# Patient Record
Sex: MALE | Race: WHITE | Employment: OTHER | URBAN - METROPOLITAN AREA
[De-identification: names, ages, dates, MRNs, and addresses within clinical notes are randomized per-mention and may not be internally consistent; named-entity substitution may affect disease eponyms.]

---

## 2017-09-30 DIAGNOSIS — E78.5 HYPERLIPIDEMIA, UNSPECIFIED HYPERLIPIDEMIA TYPE: ICD-10-CM

## 2017-10-02 RX ORDER — SIMVASTATIN 40 MG
TABLET ORAL
Qty: 90 TABLET | Refills: 3 | Status: SHIPPED | OUTPATIENT
Start: 2017-10-02 | End: 2018-04-16

## 2017-10-09 ENCOUNTER — OFFICE VISIT (OUTPATIENT)
Dept: FAMILY MEDICINE CLINIC | Age: 67
End: 2017-10-09
Payer: MEDICARE

## 2017-10-09 VITALS
SYSTOLIC BLOOD PRESSURE: 116 MMHG | HEART RATE: 80 BPM | BODY MASS INDEX: 27.94 KG/M2 | DIASTOLIC BLOOD PRESSURE: 74 MMHG | RESPIRATION RATE: 16 BRPM | HEIGHT: 67 IN | WEIGHT: 178 LBS

## 2017-10-09 DIAGNOSIS — J44.9 CHRONIC OBSTRUCTIVE PULMONARY DISEASE, UNSPECIFIED COPD TYPE (HCC): ICD-10-CM

## 2017-10-09 DIAGNOSIS — E78.5 HYPERLIPIDEMIA, UNSPECIFIED HYPERLIPIDEMIA TYPE: ICD-10-CM

## 2017-10-09 DIAGNOSIS — Z23 NEED FOR INFLUENZA VACCINATION: ICD-10-CM

## 2017-10-09 DIAGNOSIS — Z00.00 ROUTINE GENERAL MEDICAL EXAMINATION AT A HEALTH CARE FACILITY: ICD-10-CM

## 2017-10-09 DIAGNOSIS — F22 PARANOIA (HCC): ICD-10-CM

## 2017-10-09 DIAGNOSIS — R73.01 IFG (IMPAIRED FASTING GLUCOSE): Primary | ICD-10-CM

## 2017-10-09 DIAGNOSIS — I10 ESSENTIAL HYPERTENSION: ICD-10-CM

## 2017-10-09 PROCEDURE — G8419 CALC BMI OUT NRM PARAM NOF/U: HCPCS | Performed by: FAMILY MEDICINE

## 2017-10-09 PROCEDURE — 3017F COLORECTAL CA SCREEN DOC REV: CPT | Performed by: FAMILY MEDICINE

## 2017-10-09 PROCEDURE — 4040F PNEUMOC VAC/ADMIN/RCVD: CPT | Performed by: FAMILY MEDICINE

## 2017-10-09 PROCEDURE — G0008 ADMIN INFLUENZA VIRUS VAC: HCPCS | Performed by: FAMILY MEDICINE

## 2017-10-09 PROCEDURE — 4004F PT TOBACCO SCREEN RCVD TLK: CPT | Performed by: FAMILY MEDICINE

## 2017-10-09 PROCEDURE — 3023F SPIROM DOC REV: CPT | Performed by: FAMILY MEDICINE

## 2017-10-09 PROCEDURE — 1123F ACP DISCUSS/DSCN MKR DOCD: CPT | Performed by: FAMILY MEDICINE

## 2017-10-09 PROCEDURE — 90662 IIV NO PRSV INCREASED AG IM: CPT | Performed by: FAMILY MEDICINE

## 2017-10-09 PROCEDURE — G8484 FLU IMMUNIZE NO ADMIN: HCPCS | Performed by: FAMILY MEDICINE

## 2017-10-09 PROCEDURE — G8427 DOCREV CUR MEDS BY ELIG CLIN: HCPCS | Performed by: FAMILY MEDICINE

## 2017-10-09 PROCEDURE — G0439 PPPS, SUBSEQ VISIT: HCPCS | Performed by: FAMILY MEDICINE

## 2017-10-09 PROCEDURE — 99214 OFFICE O/P EST MOD 30 MIN: CPT | Performed by: FAMILY MEDICINE

## 2017-10-09 PROCEDURE — G8926 SPIRO NO PERF OR DOC: HCPCS | Performed by: FAMILY MEDICINE

## 2017-10-09 RX ORDER — AMLODIPINE BESYLATE AND BENAZEPRIL HYDROCHLORIDE 5; 10 MG/1; MG/1
CAPSULE ORAL
Qty: 90 CAPSULE | Refills: 3 | Status: SHIPPED | OUTPATIENT
Start: 2017-10-09 | End: 2018-10-16 | Stop reason: SDUPTHER

## 2017-10-09 ASSESSMENT — LIFESTYLE VARIABLES: HOW OFTEN DO YOU HAVE A DRINK CONTAINING ALCOHOL: 0

## 2017-10-09 ASSESSMENT — ANXIETY QUESTIONNAIRES: GAD7 TOTAL SCORE: 0

## 2017-10-09 ASSESSMENT — ENCOUNTER SYMPTOMS
GASTROINTESTINAL NEGATIVE: 1
RESPIRATORY NEGATIVE: 1

## 2017-10-09 ASSESSMENT — PATIENT HEALTH QUESTIONNAIRE - PHQ9: SUM OF ALL RESPONSES TO PHQ QUESTIONS 1-9: 0

## 2017-10-09 NOTE — PROGRESS NOTES
Chronic Disease Visit Information    BP Readings from Last 3 Encounters:   10/09/17 116/74   10/05/16 126/76   03/23/16 110/56          Hemoglobin A1C (%)   Date Value   07/16/2015 6.1 (H)   02/25/2014 5.6     LDL Calculated (mg/dL)   Date Value   07/16/2015 73     HDL (mg/dl)   Date Value   07/16/2015 47   03/21/2012 30     BUN (mg/dl)   Date Value   07/16/2015 15     CREATININE (mg/dl)   Date Value   07/16/2015 1.0     Glucose (mg/dl)   Date Value   07/16/2015 105 (H)            Have you changed or started any medications since your last visit including any over-the-counter medicines, vitamins, or herbal medicines? no   Are you having any side effects from any of your medications? -  no  Have you stopped taking any of your medications? Is so, why? -  no    Have you seen any other physician or provider since your last visit? No  Have you had any other diagnostic tests since your last visit? No  Have you been seen in the emergency room and/or had an admission to a hospital since we last saw you? No  Have you had your annual diabetic retinal (eye) exam? No  Have you had your routine dental cleaning in the past 6 months? yes -      Have you activated your [employer] account? If not, what are your barriers?  No: declines     Patient Care Team:  Terri Wong DO as PCP - General (Family Medicine)  Terri Wong DO as PCP - [facility] Attributed Provider  Karel Vilchis as Physician (Psychiatry)         Medical History Review  Past Medical, Family, and Social History reviewed and does contribute to the patient presenting condition    Health Maintenance   Topic Date Due    AAA screen  1950    Hepatitis C screen  1950    DTaP/Tdap/Td vaccine (1 - Tdap) 03/07/1967    Flu vaccine (1) 09/01/2017    Diabetes screen  07/16/2018    Lipid screen  07/16/2020    Colon cancer screen colonoscopy  09/03/2024    Zostavax vaccine  Addressed    Pneumococcal low/med risk  Completed       Patient was given fluzone high dose vaccine 0.5 ml IM in left deltoid per v.o. Dr Tatiana Peabody. Patient tolerated well. VIS given and reviewed.   NDC# 48329-184-41  LOT# [id number]  Exp: 5/10/18

## 2017-10-09 NOTE — PROGRESS NOTES
Subjective:      Patient ID: Deana Puckett is a 69 y.o. male. HPI:  Annual eval.    Pt doing well overall. BP well controlled. BP Readings from Last 3 Encounters:   10/09/17 116/74   10/05/16 126/76   03/23/16 110/56     Breathing doing fine. Mood well controlled on current medications. Due for labs, plans to get this at [facility]. Patient Active Problem List   Diagnosis    HTN (hypertension)    Hyperlipidemia    [facility]ia ([facility] [address]    COPD (chronic obstructive pulmonary disease) ([facility] [address]    IFG (impaired fasting glucose)     Past Surgical History:   Procedure Laterality Date    FOOT SURGERY Right 11/23/2009    LEG SURGERY Right @ 2-3 yrs old   [place] VASECTOMY  @ 27 yrs old     Prior to Admission medications    Medication Sig Start Date End Date Taking? Authorizing Provider   simvastatin (ZOCOR) 40 MG tablet TAKE 1 TABLET AT BEDTIME 10/2/17  Yes Desire Casey, DO   amLODIPine-benazepril (LOTREL) 5-10 MG per capsule TAKE 1 CAPSULE DAILY 10/5/16  Yes Desire Casey DO   OLANZapine-FLUoxetine HCl 12-50 MG CAPS Take 1 capsule by mouth nightly 11/19/15  Yes Desire Casey,    FIBER FORMULA CAPS Take 160 mg by mouth daily   Yes Historical Provider, MD   aspirin 81 MG EC tablet Take 1 tablet by mouth daily.  9/8/14  Yes Adan Smith MD       Lab Results   Component Value Date    LABA1C 6.1 (H) 07/16/2015     No results found for: EAG    No components found for: CHLPL  Lab Results   Component Value Date    TRIG 116 07/16/2015    TRIG 125 02/26/2014    TRIG 82 03/30/2013     Lab Results   Component Value Date    HDL 47 07/16/2015    HDL 31 (A) 02/26/2014    HDL 40 03/30/2013     Lab Results   Component Value Date    LDLCALC 73 07/16/2015    LDLCALC 78 02/26/2014    LDLCALC 103 03/30/2013     Lab Results   Component Value Date    LABVLDL 23 07/16/2015         Chemistry        Component Value Date/Time     07/16/2015 0851    K 4.4 07/16/2015 0851     07/16/2015 0851    CO2 28 BP: 116/74   Site: Left Arm   Position: Sitting   Cuff Size: Medium Adult   Pulse: 80   Resp: 16   Weight: 178 lb (80.7 kg)   Height: 5' 7\" (1.702 m)           The following problems were reviewed today and where indicated follow up appointments were made and/or referrals ordered. Risk Factor Screenings with Interventions:   Fall Risk:  Timed Up and Go Test > 12 seconds?: no  2 or more falls in past year?: no  Fall with injury in past year?: no  Fall Risk Interventions:    · None indicated    Depression:  PHQ-2 Score: 0  Depression Interventions:  · None indicated    Anxiety:  Anxiety Score: 0  Anxiety Interventions:  · None indicated    Cognitive: Words recalled: 3  Clock Drawing Test (CDT) Score: Normal  Cognitive Impairment Interventions:  · None indicated    Substance Abuse:  Social History     Social History Main Topics    Smoking status: Current Every Day Smoker     Packs/day: 2.00     Years: 45.00     Types: Cigars     Last attempt to quit: 6/22/2015    Smokeless tobacco: Former User     Quit date: 10/1/2011    Alcohol use No    Drug use: No    Sexual activity: Not on file     Audit Questionnaire: Screen for Alcohol Misuse  How often do you have a drink containing alcohol?: Never  Substance Abuse Interventions:  · None indicated    Health Risk Assessment:   General  In general, how would you say your health is?: Very Good  In the past 7 days, have you experienced any of the following?: None of These  Do you get the social and emotional support that you need?: Yes  Do you have a Living Will?: Yes  General Health Risk Interventions:  · None indicated    Health Habits/Nutrition  Do you exercise for at least 20 minutes 2-3 times per week?: (!) No  Have you lost any weight without trying in the past 3 months?: No  Do you eat fewer than 2 meals per day?: No  Have you seen a dentist within the past year?: Yes  Body mass index is 27.88 kg/m².   Health Habits/Nutrition Interventions:  · Inadequate physical activity:  patient agrees to wear a pedometer and walk at least 10,000 steps/day    Hearing/Vision  Do you or your family notice any trouble with your hearing?: No  Do you have difficulty driving, watching TV, or doing any of your daily activities because of your eyesight?: No  Have you had an eye exam within the past year?: Yes  Hearing/Vision Interventions:  · None indicated    Safety  Do you have working smoke detectors?: Yes  Have all throw rugs been removed or fastened?: Yes  Do you have non-slip mats in all bathtubs?: Yes  Do all of your stairways have a railing or banister?: Yes (no stairs)  Are your doorways, halls and stairs free of clutter?: Yes  Do you always fasten your seatbelt when you are in a car?: (!) No  Safety Interventions:  · Home safety tips provided    ADLs  In the past 7 days, did you need help from others to perform any of the following everyday activities?: None  In the past 7 days, did you need help from others to take care of any of the following?: None  ADL Interventions:  · None indicated    Personalized Preventive Plan   Current Health Maintenance Status  Immunization History   Administered Date(s) Administered    Influenza Vaccine, unspecified formulation 10/15/2014, 09/14/2015    Influenza, High Dose 10/09/2017    Influenza, Quadv, 3 Years and older, IM 10/05/2016    Pneumococcal 13-valent Conjugate (Birtha Pries) 07/15/2015    Pneumococcal Polysaccharide ([id number]) 02/24/2014        Health Maintenance   Topic Date Due    AAA screen  1950    Hepatitis C screen  1950    DTaP/Tdap/Td vaccine (1 - Tdap) 03/07/1967    Diabetes screen  07/16/2018    Lipid screen  07/16/2020    Colon cancer screen colonoscopy  09/03/2024    Zostavax vaccine  Addressed    Flu vaccine  Completed    Pneumococcal low/med risk  Completed     Recommendations for Preventive Services Due: see orders.   Recommended screening schedule for the next 5-10 years is provided to the patient in

## 2017-10-09 NOTE — PATIENT INSTRUCTIONS
respiratory therapist.  The four appointments span over three weeks. The respiratory therapist schedules one of the appointments to occur 48 hours after the patients quit date.  Cost $100 total for the four sessions. Tobacco cessation products are not included in the cost and are not provided by [facility] for Leonel Roberto - 10/9/2017  Medicare offers a range of preventive health benefits. Some of the tests and screenings are paid in full while other may be subject to a deductible, co-insurance, and/or copay. Some of these benefits include a comprehensive review of your medical history including lifestyle, illnesses that may run in your family, and various assessments and screenings as appropriate. After reviewing your medical record and screening and assessments performed today your provider may have ordered immunizations, labs, imaging, and/or referrals for you. A list of these orders (if applicable) as well as your Preventive Care list are included within your After Visit Summary for your review. Other Preventive Recommendations:    · A preventive eye exam performed by an eye specialist is recommended every 1-2 years to screen for glaucoma; cataracts, macular degeneration, and other eye disorders. · A preventive dental visit is recommended every 6 months. · Try to get at least 150 minutes of exercise per week or 10,000 steps per day on a pedometer . · Order or download the FREE \"Exercise & Physical Activity: Your Everyday Guide\" from [employer]. Call 6-915.543.9639 or search [employer] online. · You need 0577-0950 mg of calcium and 0302-6813 IU of vitamin D per day.  It is possible to meet your calcium requirement with diet alone, but a vitamin D supplement is usually necessary to meet this goal.  · When exposed to the sun, use a sunscreen that protects against both UVA and UVB radiation with an SPF of

## 2017-10-27 ENCOUNTER — OFFICE VISIT (OUTPATIENT)
Dept: FAMILY MEDICINE CLINIC | Age: 67
End: 2017-10-27
Payer: MEDICARE

## 2017-10-27 VITALS
DIASTOLIC BLOOD PRESSURE: 66 MMHG | WEIGHT: 171 LBS | HEART RATE: 68 BPM | RESPIRATION RATE: 12 BRPM | HEIGHT: 66 IN | OXYGEN SATURATION: 96 % | BODY MASS INDEX: 27.48 KG/M2 | SYSTOLIC BLOOD PRESSURE: 100 MMHG | TEMPERATURE: 97.5 F

## 2017-10-27 DIAGNOSIS — J40 BRONCHITIS: Primary | ICD-10-CM

## 2017-10-27 DIAGNOSIS — Z12.5 SCREENING FOR PROSTATE CANCER: ICD-10-CM

## 2017-10-27 PROCEDURE — 4040F PNEUMOC VAC/ADMIN/RCVD: CPT | Performed by: FAMILY MEDICINE

## 2017-10-27 PROCEDURE — 99213 OFFICE O/P EST LOW 20 MIN: CPT | Performed by: FAMILY MEDICINE

## 2017-10-27 PROCEDURE — G8484 FLU IMMUNIZE NO ADMIN: HCPCS | Performed by: FAMILY MEDICINE

## 2017-10-27 PROCEDURE — G8419 CALC BMI OUT NRM PARAM NOF/U: HCPCS | Performed by: FAMILY MEDICINE

## 2017-10-27 PROCEDURE — G8427 DOCREV CUR MEDS BY ELIG CLIN: HCPCS | Performed by: FAMILY MEDICINE

## 2017-10-27 PROCEDURE — 3017F COLORECTAL CA SCREEN DOC REV: CPT | Performed by: FAMILY MEDICINE

## 2017-10-27 PROCEDURE — 1123F ACP DISCUSS/DSCN MKR DOCD: CPT | Performed by: FAMILY MEDICINE

## 2017-10-27 PROCEDURE — 4004F PT TOBACCO SCREEN RCVD TLK: CPT | Performed by: FAMILY MEDICINE

## 2017-10-27 RX ORDER — AZITHROMYCIN 250 MG/1
TABLET, FILM COATED ORAL
Qty: 6 TABLET | Refills: 0 | Status: SHIPPED | OUTPATIENT
Start: 2017-10-27 | End: 2017-11-06

## 2017-10-27 RX ORDER — PREDNISONE 20 MG/1
20 TABLET ORAL 2 TIMES DAILY
Qty: 10 TABLET | Refills: 0 | Status: SHIPPED | OUTPATIENT
Start: 2017-10-27 | End: 2017-11-01

## 2017-10-27 ASSESSMENT — ENCOUNTER SYMPTOMS
SHORTNESS OF BREATH: 0
COUGH: 1
RHINORRHEA: 1
WHEEZING: 0
GASTROINTESTINAL NEGATIVE: 1

## 2017-10-27 NOTE — PROGRESS NOTES
Visit Information    Have you changed or started any medications since your last visit including any over-the-counter medicines, vitamins, or herbal medicines? no   Are you having any side effects from any of your medications? -  no  Have you stopped taking any of your medications? Is so, why? -  no    Have you seen any other physician or provider since your last visit? No  Have you had any other diagnostic tests since your last visit? No  Have you been seen in the emergency room and/or had an admission to a hospital since we last saw you? No  Have you had your routine dental cleaning in the past 6 months? yes -     Have you activated your [employer] account? If not, what are your barriers?  No: does not have a computer     Patient Care Team:  Silvia Victor DO as PCP - General (Family Medicine)  Silvia Victor DO as PCP - [facility] Attributed Provider  Socorro Lyon as Physician (Psychiatry)    Medical History Review  Past Medical, Family, and Social History reviewed and does not contribute to the patient presenting condition    Health Maintenance   Topic Date Due    AAA screen  1950    Hepatitis C screen  1950    DTaP/Tdap/Td vaccine (1 - Tdap) 03/07/1967    Diabetes screen  07/16/2018    Lipid screen  07/16/2020    Colon cancer screen colonoscopy  09/03/2024    Zostavax vaccine  Addressed    Flu vaccine  Completed    Pneumococcal low/med risk  Completed
Negative. Musculoskeletal: Negative. All other systems reviewed and are negative. Objective:   Physical Exam   Constitutional: He is oriented to person, place, and time. He appears well-developed and well-nourished. HENT:   Head: Normocephalic and atraumatic. Right Ear: Tympanic membrane normal.   Left Ear: Tympanic membrane normal.   Mouth/Throat: Oropharynx is clear and moist and mucous membranes are normal.   Cardiovascular: Normal rate, regular rhythm and normal heart sounds. No murmur heard. Pulmonary/Chest: Effort normal and breath sounds normal. He has no decreased breath sounds. He has no wheezes. He has no rhonchi. He has no rales. Abdominal: Soft. Bowel sounds are normal.   Musculoskeletal: He exhibits no edema. Neurological: He is alert and oriented to person, place, and time. Skin: Skin is warm and dry. Psychiatric: He has a normal mood and affect. His behavior is normal.   Nursing note and vitals reviewed. Assessment:      1. Bronchitis  azithromycin (ZITHROMAX Z-MARIE) 250 MG tablet    predniSONE (DELTASONE) 20 MG tablet   2.  Screening for prostate cancer  PSA, Prostatic Specific Antigen           Plan:      -  Orders above  -  OTC Coricidin  -  Check PSA at his request, will call  -  RTO prn

## 2017-10-27 NOTE — PATIENT INSTRUCTIONS
respiratory therapist.  The four appointments span over three weeks. The respiratory therapist schedules one of the appointments to occur 48 hours after the patients quit date.  Cost $100 total for the four sessions.   Tobacco cessation products are not included in the cost and are not provided by [facility].     Andrew Kirk

## 2017-11-08 ENCOUNTER — OFFICE VISIT (OUTPATIENT)
Dept: FAMILY MEDICINE CLINIC | Age: 67
End: 2017-11-08
Payer: MEDICARE

## 2017-11-08 VITALS
RESPIRATION RATE: 16 BRPM | HEART RATE: 92 BPM | HEIGHT: 66 IN | TEMPERATURE: 98 F | DIASTOLIC BLOOD PRESSURE: 50 MMHG | BODY MASS INDEX: 27.97 KG/M2 | WEIGHT: 174 LBS | OXYGEN SATURATION: 97 % | SYSTOLIC BLOOD PRESSURE: 100 MMHG

## 2017-11-08 DIAGNOSIS — R05.8 UPPER AIRWAY COUGH SYNDROME: Primary | ICD-10-CM

## 2017-11-08 PROCEDURE — G8427 DOCREV CUR MEDS BY ELIG CLIN: HCPCS | Performed by: FAMILY MEDICINE

## 2017-11-08 PROCEDURE — 1123F ACP DISCUSS/DSCN MKR DOCD: CPT | Performed by: FAMILY MEDICINE

## 2017-11-08 PROCEDURE — 4004F PT TOBACCO SCREEN RCVD TLK: CPT | Performed by: FAMILY MEDICINE

## 2017-11-08 PROCEDURE — 3017F COLORECTAL CA SCREEN DOC REV: CPT | Performed by: FAMILY MEDICINE

## 2017-11-08 PROCEDURE — 4040F PNEUMOC VAC/ADMIN/RCVD: CPT | Performed by: FAMILY MEDICINE

## 2017-11-08 PROCEDURE — G8484 FLU IMMUNIZE NO ADMIN: HCPCS | Performed by: FAMILY MEDICINE

## 2017-11-08 PROCEDURE — 99213 OFFICE O/P EST LOW 20 MIN: CPT | Performed by: FAMILY MEDICINE

## 2017-11-08 PROCEDURE — G8419 CALC BMI OUT NRM PARAM NOF/U: HCPCS | Performed by: FAMILY MEDICINE

## 2017-11-08 RX ORDER — IPRATROPIUM BROMIDE 42 UG/1
2 SPRAY, METERED NASAL 3 TIMES DAILY
Qty: 1 BOTTLE | Refills: 2 | Status: SHIPPED | OUTPATIENT
Start: 2017-11-08 | End: 2017-12-15 | Stop reason: ALTCHOICE

## 2017-11-08 RX ORDER — PREDNISONE 20 MG/1
20 TABLET ORAL 2 TIMES DAILY
Qty: 10 TABLET | Refills: 0 | Status: SHIPPED | OUTPATIENT
Start: 2017-11-08 | End: 2017-11-13

## 2017-11-08 RX ORDER — BENZONATATE 100 MG/1
100 CAPSULE ORAL 3 TIMES DAILY PRN
Qty: 15 CAPSULE | Refills: 0 | Status: SHIPPED | OUTPATIENT
Start: 2017-11-08 | End: 2017-11-13

## 2017-11-08 ASSESSMENT — ENCOUNTER SYMPTOMS
WHEEZING: 0
RHINORRHEA: 1
SORE THROAT: 0
GASTROINTESTINAL NEGATIVE: 1
SHORTNESS OF BREATH: 0
COUGH: 1
SINUS PRESSURE: 0

## 2017-11-08 NOTE — PROGRESS NOTES
Subjective:      Patient ID: Torito Gomez is a 69 y.o. male. HPI:    Chief Complaint   Patient presents with    Cough     runny nose  onset 2 weeks     Pt here for persistent cough and RN. S/p Zpak and steroids with relief, symptoms returned once he completed the course of medication. No wheezing. No fevers. Denies sinus pressure or HA's. Denies GERD. Patient Active Problem List   Diagnosis    HTN (hypertension)    Hyperlipidemia    [facility] [address]    COPD (chronic obstructive pulmonary disease) ([facility] [address]    IFG (impaired fasting glucose)     Past Surgical History:   Procedure Laterality Date    FOOT SURGERY Right 11/23/2009    LEG SURGERY Right @ 2-3 yrs old   [employer] VASECTOMY  @ 27 yrs old     Prior to Admission medications    Medication Sig Start Date End Date Taking? Authorizing Provider   amLODIPine-benazepril (LOTREL) 5-10 MG per capsule TAKE 1 CAPSULE DAILY 10/9/17  Yes Terri Wong, DO   albuterol-ipratropium (COMBIVENT RESPIMAT)  MCG/ACT AERS inhaler Inhale 1 puff into the lungs every 6 hours as needed for Wheezing or Shortness of Breath 10/9/17  Yes Terri Wong, DO   simvastatin (ZOCOR) 40 MG tablet TAKE 1 TABLET AT BEDTIME 10/2/17  Yes Terri Wong, DO   OLANZapine-FLUoxetine HCl 12-50 MG CAPS Take 1 capsule by mouth nightly 11/19/15  Yes Terri Wong, DO   FIBER FORMULA CAPS Take 160 mg by mouth daily   Yes Historical Provider, MD   aspirin 81 MG EC tablet Take 1 tablet by mouth daily. 9/8/14  Yes Kobi Chakraborty MD         Review of Systems   Constitutional: Negative. Negative for fever. HENT: Positive for postnasal drip and rhinorrhea. Negative for congestion, sinus pressure and sore throat. Respiratory: Positive for cough. Negative for shortness of breath and wheezing. Cardiovascular: Negative. Gastrointestinal: Negative. Musculoskeletal: Negative. All other systems reviewed and are negative.       Objective:   Physical Exam Constitutional: He is oriented to person, place, and time. He appears well-developed and well-nourished. HENT:   Head: Normocephalic and atraumatic. Right Ear: Tympanic membrane normal.   Left Ear: Tympanic membrane normal.   Nose: Mucosal edema and rhinorrhea present. Mouth/Throat: Oropharynx is clear and moist. Mucous membranes are dry. Cardiovascular: Normal rate, regular rhythm and normal heart sounds. No murmur heard. Pulmonary/Chest: Effort normal and breath sounds normal. He has no decreased breath sounds. He has no wheezes. He has no rhonchi. He has no rales. Abdominal: Soft. Bowel sounds are normal.   Musculoskeletal: He exhibits no edema. Neurological: He is alert and oriented to person, place, and time. Skin: Skin is warm and dry. Psychiatric: He has a normal mood and affect. His behavior is normal.   Nursing note and vitals reviewed. Assessment:      1.  Upper airway cough syndrome  predniSONE (DELTASONE) 20 MG tablet    benzonatate (TESSALON PERLES) 100 MG capsule    ipratropium (ATROVENT) 0.06 % nasal spray           Plan:      -  Orders above  -  OTC Coricidin  -  Must stop smoking  -  Increase water intake  -  RTO prn

## 2017-12-15 ENCOUNTER — OFFICE VISIT (OUTPATIENT)
Dept: PSYCHIATRY | Age: 67
End: 2017-12-15
Payer: MEDICARE

## 2017-12-15 VITALS
SYSTOLIC BLOOD PRESSURE: 118 MMHG | DIASTOLIC BLOOD PRESSURE: 70 MMHG | HEART RATE: 72 BPM | WEIGHT: 180 LBS | HEIGHT: 66 IN | BODY MASS INDEX: 28.93 KG/M2

## 2017-12-15 DIAGNOSIS — F31.76 BIPOLAR I DISORDER, MODERATE, CURRENT OR MOST RECENT EPISODE DEPRESSED, IN FULL REMISSION (HCC): Primary | ICD-10-CM

## 2017-12-15 PROCEDURE — 1036F TOBACCO NON-USER: CPT | Performed by: NURSE PRACTITIONER

## 2017-12-15 PROCEDURE — 90792 PSYCH DIAG EVAL W/MED SRVCS: CPT | Performed by: NURSE PRACTITIONER

## 2017-12-15 RX ORDER — OLANZAPINE AND FLUOXETINE 12; 50 MG/1; MG/1
1 CAPSULE ORAL NIGHTLY
Qty: 30 CAPSULE | Refills: 2 | Status: SHIPPED | OUTPATIENT
Start: 2017-12-15 | End: 2018-03-15 | Stop reason: SDUPTHER

## 2017-12-15 ASSESSMENT — PATIENT HEALTH QUESTIONNAIRE - PHQ9
SUM OF ALL RESPONSES TO PHQ QUESTIONS 1-9: 0
10. IF YOU CHECKED OFF ANY PROBLEMS, HOW DIFFICULT HAVE THESE PROBLEMS MADE IT FOR YOU TO DO YOUR WORK, TAKE CARE OF THINGS AT HOME, OR GET ALONG WITH OTHER PEOPLE: 0
4. FEELING TIRED OR HAVING LITTLE ENERGY: 0
1. LITTLE INTEREST OR PLEASURE IN DOING THINGS: 0
SUM OF ALL RESPONSES TO PHQ9 QUESTIONS 1 & 2: 0
3. TROUBLE FALLING OR STAYING ASLEEP: 0
5. POOR APPETITE OR OVEREATING: 0
2. FEELING DOWN, DEPRESSED OR HOPELESS: 0
6. FEELING BAD ABOUT YOURSELF - OR THAT YOU ARE A FAILURE OR HAVE LET YOURSELF OR YOUR FAMILY DOWN: 0
8. MOVING OR SPEAKING SO SLOWLY THAT OTHER PEOPLE COULD HAVE NOTICED. OR THE OPPOSITE, BEING SO FIGETY OR RESTLESS THAT YOU HAVE BEEN MOVING AROUND A LOT MORE THAN USUAL: 0
7. TROUBLE CONCENTRATING ON THINGS, SUCH AS READING THE NEWSPAPER OR WATCHING TELEVISION: 0
9. THOUGHTS THAT YOU WOULD BE BETTER OFF DEAD, OR OF HURTING YOURSELF: 0

## 2017-12-15 ASSESSMENT — ANXIETY QUESTIONNAIRES
4. TROUBLE RELAXING: 0-NOT AT ALL SURE
1. FEELING NERVOUS, ANXIOUS, OR ON EDGE: 1-SEVERAL DAYS
5. BEING SO RESTLESS THAT IT IS HARD TO SIT STILL: 0-NOT AT ALL SURE
7. FEELING AFRAID AS IF SOMETHING AWFUL MIGHT HAPPEN: 1-SEVERAL DAYS
3. WORRYING TOO MUCH ABOUT DIFFERENT THINGS: 1-SEVERAL DAYS
6. BECOMING EASILY ANNOYED OR IRRITABLE: 0-NOT AT ALL SURE
2. NOT BEING ABLE TO STOP OR CONTROL WORRYING: 1-SEVERAL DAYS
GAD7 TOTAL SCORE: 4

## 2017-12-15 NOTE — PATIENT INSTRUCTIONS
Patient has been instructed to seek emergency help via the emergency and/or calling 911 should symptoms become severe, worsen, or with other concerning symptoms. Patient instructed to go immediately to the emergency room and/or call 911 with any suicidal or homicidal ideations or if audio/visual hallucinations develop. Patient given crisis center information.

## 2017-12-15 NOTE — PROGRESS NOTES
which has worked very well to control his moods. He reports he exercises regularly and follows a very strict diet because he has had weight gain while taking the olanzapine/fluoxetine medication. As long as he continues to monitor his diet and exercise regularly he does not experience any of the weight gain. Patient states he had several incidents where numerous co-workers were \"aggravating needs to quit drinking. \"  He recalls many of his coworkers suggesting he was an alcoholic because his arms and legs would often shake. Patient states \"I could not take no more of a sewing went to the hospital.\"  He states he did have one follow-up with the physician who suggested he keep a note pad of the coworkers who were bothering him and then report to his supervisor about these coworkers. He states that coworkers stopped bothering him at that time. He was working 12 hours per day 7 days per week. He states he recalls feeling depressed and down and sat at that time. He states he was not able to drive when he finally did present to the hospital.  Patient states \"I did not know where it was at. I remember getting asked questions and being told I was not listening, but I was clear out of it. \"  He states after a few days in the hospital he began feeling better and was allowed to go home for a few hours on the weekend but then returned to the hospital.  He states he remembers during his hospitalization that his mind would continuously wander. He recalls feeling extremely down and sad. Patient states he also remembers before he ever saw psychiatrist he would \"prance back and forth all the time. \"  He states he would come home from work and then would walk back and forth continuously in the living room. He would smoke excessively and was unable to sleep. He states he would go 3 or more days without sleep. When he went to his family doctor he was placed on \"nerve pills. \" He was then sent to the psychiatrist, but he on his own and has always managed his own finances. He lives off of his pension and [address]. All of his bills are automatically withdrawn and he has been able to save money. He states he has never been fired from a job and worked at [employer] for 31 years before he retired. Patient denies any excessive spending or other kiley type symptoms. Despite the stressors, patient states he believes his mood has been very well-controlled. Patient denies any feelings of depression or anxiety. He states he is sleeping well. Patient denies suicidal ideations, intent, plan. No homicidal ideations, intent, plan. No audio or visual hallucinations. Patient reports his childhood was very good. He felt well and well cared for. He got along well with others and had many friends. He states growing up he and his family got along well. He did well in school and did not get into any trouble. HPI      PSYCHIATRIC HISTORY:    Patient has had prior care with the following:    [x] Psychiatrist (Dr. Bruce Maurer, Dr. Janice Cruz, and Dr. Jean Carlos Larios)    [] Psychologist    [] Other Therapist    [] None    The patient has had 1 lifetime suicide attempts. Methods used for the suicide attempts include placing a gun into his mouth. The patient's most recent suicide attempt was 0. Patient reports 1 psych hospital admissions with the last admission taking place in the 1980s for 3 weeks. States he was told he had a \"chemical imbalance in my system. \"    Past psychiatric medications include: Lithium, Xanax, Valium, Lamictal    Adverse reactions from psychotropic medications:  Weight gain with Olanzapine-Fluoxetine - controlled with diet and exercise      Lifetime Psychiatric Review of Systems         Kiley or Hypomania:  yes - see above     Panic Attacks:  no     Phobias:  no     Obsessions and Compulsions:  no     Body or Vocal Tics:  no     Hallucinations:  no     Delusions:  no    SOCIAL HISTORY:  Patient was born in [place], [place] and raised by his biological parents      Social History     Social History    Marital status:      Spouse name: N/A    Number of children: 1    Years of education: N/A     Occupational History    Not on file. Social History Main Topics    Smoking status: Former Smoker     Packs/day: 1.00     Years: 45.00     Types: Cigars    Smokeless tobacco: Never Used      Comment: quit smoking in 6/2015 but restarted in 2016. States he smokes the \"little tiny cigars\"    Alcohol use No    Drug use: No    Sexual activity: Not on file     Other Topics Concern    Not on file     Social History Narrative    12/15/2017    LEVEL OF EDUCATION: graduated high school    SPECIAL EDUCATION NEEDS: None    RESIDENCE: Currently lives alone    LEGAL HISTORY: DUI in 2000 or 2001. Lost his 's license for 6 months. Then after that he quit drinking alcohol completely. 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 [employer] home at age 44 after 32 years at [employer]. Then worked at several different positions before retiring again in 2015 after working the previous 8 years for the [employer]. Patient states he wants to go find a job because \"I have too much time on my hands and I get bored sitting around at home. \" States he would like to work part-time    SUBSTANCE USE:    1. Alcohol: patient states \"I drank too much in the 70s. I regret it now but back then I didn't think I was drinking that much. \" Was drinking every day in the 70s before he got . After he  he only drank when they went to an event. States his last alcohol drink was in 2003. MARRIAGE: been  twice. First marriage at age 23 lasted for 3 years. Second marriage was for 10 or 11 years with the divorce finalized in 1989.     CHILDREN: one biological adult daughter             FAMILY HISTORY:   Family History grossly intact   Memory: intact   Insight:  good   Judgment: good   Suicidal Ideations: denies suicidal ideation   Homicidal Ideations: Negative for homicidal ideation      Medication Side Effects: absent       Attention Span attention span and concentration were age appropriate     Screenings Completed in This Encounter:     Anxiety and Depression:      CARLOS-7  Feeling nervious, anxious, or on edge: 1-Several days  Not able to stop or control worryin-Several days  Worrying too much about different things: 1-Several days  Trouble relaxin-Not at all sure  Being so restless that it's hard to sit still: 0-Not at all sure  Becoming easily annoyed or irritable: 0-Not at all sure  Feeling afraid as if something awful might happen: 1-Several days  CARLOS-7 Total Score: 4    PHQ-2 Over the past 2 weeks, how often have you been bothered by any of the following problems? Little interest or pleasure in doing things: Not at all  Feeling down, depressed, or hopeless: Not at all  PHQ-2 Score: 0  PHQ-9 Over the past 2 weeks, how often have you been bothered by any of the following problems? Trouble falling or staying asleep, or sleeping too much: Not at all  Feeling tired or having little energy: Not at all  Poor appetite or overeating: Not at all  Feeling bad about yourself - or that you are a failure or have let yourself or your family down: Not at all  Trouble concentrating on things, such as reading the newspaper or watching television: Not at all  Moving or speaking so slowly that other people could have noticed.  Or the opposite - being so fidgety or restless that you have been moving around a lot more than usual: Not at all  Thoughts that you would be better off dead, or of hurting yourself in some way: Not at all  If you checked off any problems, how difficult have these problems made it for you to do your work, take care of things at home, or get along with other people?: Not difficult at all  PHQ-9 Completed?:

## 2018-03-15 ENCOUNTER — OFFICE VISIT (OUTPATIENT)
Dept: PSYCHIATRY | Age: 68
End: 2018-03-15
Payer: MEDICARE

## 2018-03-15 VITALS
HEIGHT: 66 IN | BODY MASS INDEX: 29.73 KG/M2 | DIASTOLIC BLOOD PRESSURE: 66 MMHG | SYSTOLIC BLOOD PRESSURE: 126 MMHG | HEART RATE: 85 BPM | WEIGHT: 185 LBS

## 2018-03-15 DIAGNOSIS — F31.76 BIPOLAR I DISORDER, MODERATE, CURRENT OR MOST RECENT EPISODE DEPRESSED, IN FULL REMISSION (HCC): ICD-10-CM

## 2018-03-15 PROCEDURE — 3017F COLORECTAL CA SCREEN DOC REV: CPT | Performed by: NURSE PRACTITIONER

## 2018-03-15 PROCEDURE — G8419 CALC BMI OUT NRM PARAM NOF/U: HCPCS | Performed by: NURSE PRACTITIONER

## 2018-03-15 PROCEDURE — 1123F ACP DISCUSS/DSCN MKR DOCD: CPT | Performed by: NURSE PRACTITIONER

## 2018-03-15 PROCEDURE — 4040F PNEUMOC VAC/ADMIN/RCVD: CPT | Performed by: NURSE PRACTITIONER

## 2018-03-15 PROCEDURE — G8427 DOCREV CUR MEDS BY ELIG CLIN: HCPCS | Performed by: NURSE PRACTITIONER

## 2018-03-15 PROCEDURE — 99213 OFFICE O/P EST LOW 20 MIN: CPT | Performed by: NURSE PRACTITIONER

## 2018-03-15 PROCEDURE — 4004F PT TOBACCO SCREEN RCVD TLK: CPT | Performed by: NURSE PRACTITIONER

## 2018-03-15 PROCEDURE — G8482 FLU IMMUNIZE ORDER/ADMIN: HCPCS | Performed by: NURSE PRACTITIONER

## 2018-03-15 RX ORDER — OLANZAPINE AND FLUOXETINE 12; 50 MG/1; MG/1
1 CAPSULE ORAL NIGHTLY
Qty: 30 CAPSULE | Refills: 2 | Status: SHIPPED | OUTPATIENT
Start: 2018-03-15 | End: 2018-06-11 | Stop reason: SDUPTHER

## 2018-04-16 ENCOUNTER — OFFICE VISIT (OUTPATIENT)
Dept: FAMILY MEDICINE CLINIC | Age: 68
End: 2018-04-16
Payer: MEDICARE

## 2018-04-16 VITALS
SYSTOLIC BLOOD PRESSURE: 124 MMHG | HEART RATE: 84 BPM | BODY MASS INDEX: 30.67 KG/M2 | HEIGHT: 66 IN | DIASTOLIC BLOOD PRESSURE: 84 MMHG | RESPIRATION RATE: 15 BRPM | WEIGHT: 190.8 LBS | OXYGEN SATURATION: 97 %

## 2018-04-16 DIAGNOSIS — M54.16 LUMBAR RADICULITIS: ICD-10-CM

## 2018-04-16 DIAGNOSIS — E78.49 OTHER HYPERLIPIDEMIA: ICD-10-CM

## 2018-04-16 DIAGNOSIS — F22 PARANOIA (HCC): ICD-10-CM

## 2018-04-16 DIAGNOSIS — R73.01 IFG (IMPAIRED FASTING GLUCOSE): ICD-10-CM

## 2018-04-16 DIAGNOSIS — I10 ESSENTIAL HYPERTENSION: ICD-10-CM

## 2018-04-16 DIAGNOSIS — R29.898 LEG WEAKNESS, BILATERAL: Primary | ICD-10-CM

## 2018-04-16 DIAGNOSIS — J44.9 CHRONIC OBSTRUCTIVE PULMONARY DISEASE, UNSPECIFIED COPD TYPE (HCC): ICD-10-CM

## 2018-04-16 DIAGNOSIS — F31.76 BIPOLAR I DISORDER, MODERATE, CURRENT OR MOST RECENT EPISODE DEPRESSED, IN FULL REMISSION (HCC): ICD-10-CM

## 2018-04-16 DIAGNOSIS — M51.36 DDD (DEGENERATIVE DISC DISEASE), LUMBAR: ICD-10-CM

## 2018-04-16 PROCEDURE — 4004F PT TOBACCO SCREEN RCVD TLK: CPT | Performed by: FAMILY MEDICINE

## 2018-04-16 PROCEDURE — 3017F COLORECTAL CA SCREEN DOC REV: CPT | Performed by: FAMILY MEDICINE

## 2018-04-16 PROCEDURE — G8926 SPIRO NO PERF OR DOC: HCPCS | Performed by: FAMILY MEDICINE

## 2018-04-16 PROCEDURE — 3023F SPIROM DOC REV: CPT | Performed by: FAMILY MEDICINE

## 2018-04-16 PROCEDURE — 1123F ACP DISCUSS/DSCN MKR DOCD: CPT | Performed by: FAMILY MEDICINE

## 2018-04-16 PROCEDURE — 4040F PNEUMOC VAC/ADMIN/RCVD: CPT | Performed by: FAMILY MEDICINE

## 2018-04-16 PROCEDURE — G8427 DOCREV CUR MEDS BY ELIG CLIN: HCPCS | Performed by: FAMILY MEDICINE

## 2018-04-16 PROCEDURE — G8417 CALC BMI ABV UP PARAM F/U: HCPCS | Performed by: FAMILY MEDICINE

## 2018-04-16 PROCEDURE — 99214 OFFICE O/P EST MOD 30 MIN: CPT | Performed by: FAMILY MEDICINE

## 2018-04-16 ASSESSMENT — ENCOUNTER SYMPTOMS
RESPIRATORY NEGATIVE: 1
GASTROINTESTINAL NEGATIVE: 1
BACK PAIN: 1

## 2018-04-18 ENCOUNTER — HOSPITAL ENCOUNTER (OUTPATIENT)
Dept: GENERAL RADIOLOGY | Age: 68
Discharge: HOME | End: 2018-04-18
Payer: MEDICARE

## 2018-04-18 ENCOUNTER — HOSPITAL ENCOUNTER (OUTPATIENT)
Age: 68
Discharge: HOME | End: 2018-04-18
Payer: MEDICARE

## 2018-04-18 DIAGNOSIS — M54.16 LUMBAR RADICULITIS: ICD-10-CM

## 2018-04-18 DIAGNOSIS — M51.36 DDD (DEGENERATIVE DISC DISEASE), LUMBAR: ICD-10-CM

## 2018-04-18 PROCEDURE — 72100 X-RAY EXAM L-S SPINE 2/3 VWS: CPT

## 2018-04-19 ENCOUNTER — TELEPHONE (OUTPATIENT)
Dept: PSYCHIATRY | Age: 68
End: 2018-04-19

## 2018-04-20 LAB
CHOLESTEROL/HDL RATIO: 4.8
CHOLESTEROL: 190 MG/DL
HDLC SERPL-MCNC: 40 MG/DL
LDL CHOLESTEROL CALCULATED: 120 MG/DL
LDL/HDL RATIO: 3
TRIGL SERPL-MCNC: 152 MG/DL
VLDLC SERPL CALC-MCNC: 30 MG/DL

## 2018-05-15 ENCOUNTER — TELEPHONE (OUTPATIENT)
Dept: PSYCHIATRY | Age: 68
End: 2018-05-15

## 2018-05-16 ENCOUNTER — OFFICE VISIT (OUTPATIENT)
Dept: FAMILY MEDICINE CLINIC | Age: 68
End: 2018-05-16
Payer: MEDICARE

## 2018-05-16 VITALS
WEIGHT: 195 LBS | OXYGEN SATURATION: 98 % | HEIGHT: 66 IN | RESPIRATION RATE: 14 BRPM | HEART RATE: 80 BPM | DIASTOLIC BLOOD PRESSURE: 50 MMHG | SYSTOLIC BLOOD PRESSURE: 96 MMHG | BODY MASS INDEX: 31.34 KG/M2

## 2018-05-16 DIAGNOSIS — E55.9 VITAMIN D DEFICIENCY: ICD-10-CM

## 2018-05-16 DIAGNOSIS — E78.49 OTHER HYPERLIPIDEMIA: ICD-10-CM

## 2018-05-16 DIAGNOSIS — L82.1 SEBORRHEIC KERATOSIS: Primary | ICD-10-CM

## 2018-05-16 DIAGNOSIS — R29.898 WEAKNESS OF BOTH LOWER EXTREMITIES: ICD-10-CM

## 2018-05-16 DIAGNOSIS — R26.81 UNSTABLE GAIT: ICD-10-CM

## 2018-05-16 DIAGNOSIS — R73.01 IFG (IMPAIRED FASTING GLUCOSE): ICD-10-CM

## 2018-05-16 DIAGNOSIS — F31.76 BIPOLAR I DISORDER, MODERATE, CURRENT OR MOST RECENT EPISODE DEPRESSED, IN FULL REMISSION (HCC): ICD-10-CM

## 2018-05-16 DIAGNOSIS — I10 ESSENTIAL HYPERTENSION: ICD-10-CM

## 2018-05-16 PROCEDURE — G8417 CALC BMI ABV UP PARAM F/U: HCPCS | Performed by: FAMILY MEDICINE

## 2018-05-16 PROCEDURE — G8427 DOCREV CUR MEDS BY ELIG CLIN: HCPCS | Performed by: FAMILY MEDICINE

## 2018-05-16 PROCEDURE — 4004F PT TOBACCO SCREEN RCVD TLK: CPT | Performed by: FAMILY MEDICINE

## 2018-05-16 PROCEDURE — 99214 OFFICE O/P EST MOD 30 MIN: CPT | Performed by: FAMILY MEDICINE

## 2018-05-16 PROCEDURE — 1123F ACP DISCUSS/DSCN MKR DOCD: CPT | Performed by: FAMILY MEDICINE

## 2018-05-16 PROCEDURE — 4040F PNEUMOC VAC/ADMIN/RCVD: CPT | Performed by: FAMILY MEDICINE

## 2018-05-16 PROCEDURE — 3017F COLORECTAL CA SCREEN DOC REV: CPT | Performed by: FAMILY MEDICINE

## 2018-05-16 ASSESSMENT — ENCOUNTER SYMPTOMS
ROS SKIN COMMENTS: MOLE ON BACK
BACK PAIN: 1
RESPIRATORY NEGATIVE: 1
GASTROINTESTINAL NEGATIVE: 1

## 2018-06-05 ENCOUNTER — HOSPITAL ENCOUNTER (EMERGENCY)
Age: 68
Discharge: HOME | End: 2018-06-05
Attending: FAMILY MEDICINE
Payer: MEDICARE

## 2018-06-05 VITALS
RESPIRATION RATE: 16 BRPM | DIASTOLIC BLOOD PRESSURE: 108 MMHG | HEART RATE: 89 BPM | OXYGEN SATURATION: 97 % | TEMPERATURE: 98.6 F | SYSTOLIC BLOOD PRESSURE: 169 MMHG

## 2018-06-05 DIAGNOSIS — M79.651 PAIN OF RIGHT THIGH: Primary | ICD-10-CM

## 2018-06-05 PROCEDURE — 99282 EMERGENCY DEPT VISIT SF MDM: CPT

## 2018-06-05 RX ORDER — IBUPROFEN 400 MG/1
400 TABLET ORAL EVERY 8 HOURS PRN
Qty: 15 TABLET | Refills: 0 | Status: SHIPPED | OUTPATIENT
Start: 2018-06-05 | End: 2018-08-16

## 2018-06-05 RX ORDER — METHYLPREDNISOLONE 4 MG/1
TABLET ORAL
Qty: 1 KIT | Refills: 0 | Status: SHIPPED | OUTPATIENT
Start: 2018-06-05 | End: 2018-06-11 | Stop reason: ALTCHOICE

## 2018-06-05 ASSESSMENT — ENCOUNTER SYMPTOMS
VOMITING: 0
RHINORRHEA: 0
DIARRHEA: 0
EYE REDNESS: 0
COUGH: 0
SORE THROAT: 0
NAUSEA: 0
ABDOMINAL PAIN: 0
WHEEZING: 0
SHORTNESS OF BREATH: 0
EYE DISCHARGE: 0
BACK PAIN: 0

## 2018-06-05 ASSESSMENT — PAIN DESCRIPTION - LOCATION: LOCATION: HIP;LEG

## 2018-06-05 ASSESSMENT — PAIN DESCRIPTION - ORIENTATION: ORIENTATION: RIGHT

## 2018-06-05 ASSESSMENT — PAIN DESCRIPTION - PAIN TYPE: TYPE: ACUTE PAIN

## 2018-06-05 ASSESSMENT — PAIN SCALES - GENERAL: PAINLEVEL_OUTOF10: 3

## 2018-06-11 ENCOUNTER — OFFICE VISIT (OUTPATIENT)
Dept: PSYCHIATRY | Age: 68
End: 2018-06-11
Payer: MEDICARE

## 2018-06-11 VITALS
HEIGHT: 66 IN | WEIGHT: 196 LBS | HEART RATE: 97 BPM | BODY MASS INDEX: 31.5 KG/M2 | SYSTOLIC BLOOD PRESSURE: 120 MMHG | DIASTOLIC BLOOD PRESSURE: 73 MMHG

## 2018-06-11 DIAGNOSIS — F31.76 BIPOLAR I DISORDER, MODERATE, CURRENT OR MOST RECENT EPISODE DEPRESSED, IN FULL REMISSION (HCC): ICD-10-CM

## 2018-06-11 PROCEDURE — G8427 DOCREV CUR MEDS BY ELIG CLIN: HCPCS | Performed by: NURSE PRACTITIONER

## 2018-06-11 PROCEDURE — 99213 OFFICE O/P EST LOW 20 MIN: CPT | Performed by: NURSE PRACTITIONER

## 2018-06-11 PROCEDURE — 4040F PNEUMOC VAC/ADMIN/RCVD: CPT | Performed by: NURSE PRACTITIONER

## 2018-06-11 PROCEDURE — 3017F COLORECTAL CA SCREEN DOC REV: CPT | Performed by: NURSE PRACTITIONER

## 2018-06-11 PROCEDURE — 4004F PT TOBACCO SCREEN RCVD TLK: CPT | Performed by: NURSE PRACTITIONER

## 2018-06-11 PROCEDURE — G8417 CALC BMI ABV UP PARAM F/U: HCPCS | Performed by: NURSE PRACTITIONER

## 2018-06-11 PROCEDURE — 1123F ACP DISCUSS/DSCN MKR DOCD: CPT | Performed by: NURSE PRACTITIONER

## 2018-06-11 RX ORDER — OLANZAPINE AND FLUOXETINE 12; 50 MG/1; MG/1
1 CAPSULE ORAL NIGHTLY
Qty: 90 CAPSULE | Refills: 0 | Status: SHIPPED | OUTPATIENT
Start: 2018-06-11 | End: 2018-09-05 | Stop reason: SDUPTHER

## 2018-06-14 ENCOUNTER — OFFICE VISIT (OUTPATIENT)
Dept: FAMILY MEDICINE CLINIC | Age: 68
End: 2018-06-14
Payer: MEDICARE

## 2018-06-14 VITALS
DIASTOLIC BLOOD PRESSURE: 74 MMHG | RESPIRATION RATE: 16 BRPM | HEART RATE: 60 BPM | HEIGHT: 66 IN | BODY MASS INDEX: 31.23 KG/M2 | WEIGHT: 194.3 LBS | SYSTOLIC BLOOD PRESSURE: 136 MMHG

## 2018-06-14 DIAGNOSIS — M54.16 LUMBAR RADICULITIS: Primary | ICD-10-CM

## 2018-06-14 PROCEDURE — 1123F ACP DISCUSS/DSCN MKR DOCD: CPT | Performed by: FAMILY MEDICINE

## 2018-06-14 PROCEDURE — G8417 CALC BMI ABV UP PARAM F/U: HCPCS | Performed by: FAMILY MEDICINE

## 2018-06-14 PROCEDURE — 4040F PNEUMOC VAC/ADMIN/RCVD: CPT | Performed by: FAMILY MEDICINE

## 2018-06-14 PROCEDURE — 4004F PT TOBACCO SCREEN RCVD TLK: CPT | Performed by: FAMILY MEDICINE

## 2018-06-14 PROCEDURE — G8427 DOCREV CUR MEDS BY ELIG CLIN: HCPCS | Performed by: FAMILY MEDICINE

## 2018-06-14 PROCEDURE — 3017F COLORECTAL CA SCREEN DOC REV: CPT | Performed by: FAMILY MEDICINE

## 2018-06-14 PROCEDURE — 99213 OFFICE O/P EST LOW 20 MIN: CPT | Performed by: FAMILY MEDICINE

## 2018-06-14 RX ORDER — PREDNISONE 20 MG/1
TABLET ORAL
Qty: 25 TABLET | Refills: 0 | Status: SHIPPED | OUTPATIENT
Start: 2018-06-14 | End: 2018-08-16 | Stop reason: ALTCHOICE

## 2018-06-14 ASSESSMENT — ENCOUNTER SYMPTOMS
BACK PAIN: 1
GASTROINTESTINAL NEGATIVE: 1
RESPIRATORY NEGATIVE: 1

## 2018-07-11 LAB
AVERAGE GLUCOSE: 128 MG/DL (ref 66–114)
CHOLESTEROL/HDL RATIO: 4.4
CHOLESTEROL: 185 MG/DL
HBA1C MFR BLD: 6.1 % (ref 4.2–5.8)
HDLC SERPL-MCNC: 42 MG/DL
LDL CHOLESTEROL CALCULATED: 116 MG/DL
LDL/HDL RATIO: 2.8
TRIGL SERPL-MCNC: 136 MG/DL
VLDLC SERPL CALC-MCNC: 27 MG/DL

## 2018-07-12 LAB — VITAMIN D 25-HYDROXY: 15 NG/ML

## 2018-08-16 ENCOUNTER — OFFICE VISIT (OUTPATIENT)
Dept: FAMILY MEDICINE CLINIC | Age: 68
End: 2018-08-16
Payer: MEDICARE

## 2018-08-16 VITALS
RESPIRATION RATE: 16 BRPM | DIASTOLIC BLOOD PRESSURE: 70 MMHG | SYSTOLIC BLOOD PRESSURE: 136 MMHG | BODY MASS INDEX: 30.52 KG/M2 | HEIGHT: 66 IN | WEIGHT: 189.9 LBS | HEART RATE: 80 BPM

## 2018-08-16 DIAGNOSIS — M54.16 LUMBAR RADICULAR PAIN: ICD-10-CM

## 2018-08-16 DIAGNOSIS — F31.76 BIPOLAR I DISORDER, MODERATE, CURRENT OR MOST RECENT EPISODE DEPRESSED, IN FULL REMISSION (HCC): ICD-10-CM

## 2018-08-16 DIAGNOSIS — M51.36 DDD (DEGENERATIVE DISC DISEASE), LUMBAR: ICD-10-CM

## 2018-08-16 DIAGNOSIS — R73.01 IFG (IMPAIRED FASTING GLUCOSE): ICD-10-CM

## 2018-08-16 DIAGNOSIS — J44.9 CHRONIC OBSTRUCTIVE PULMONARY DISEASE, UNSPECIFIED COPD TYPE (HCC): ICD-10-CM

## 2018-08-16 DIAGNOSIS — E55.9 VITAMIN D DEFICIENCY: ICD-10-CM

## 2018-08-16 DIAGNOSIS — E78.49 OTHER HYPERLIPIDEMIA: ICD-10-CM

## 2018-08-16 DIAGNOSIS — I10 ESSENTIAL HYPERTENSION: Primary | ICD-10-CM

## 2018-08-16 DIAGNOSIS — M16.11 PRIMARY OSTEOARTHRITIS OF RIGHT HIP: ICD-10-CM

## 2018-08-16 PROCEDURE — 99214 OFFICE O/P EST MOD 30 MIN: CPT | Performed by: FAMILY MEDICINE

## 2018-08-16 PROCEDURE — 1123F ACP DISCUSS/DSCN MKR DOCD: CPT | Performed by: FAMILY MEDICINE

## 2018-08-16 PROCEDURE — G8926 SPIRO NO PERF OR DOC: HCPCS | Performed by: FAMILY MEDICINE

## 2018-08-16 PROCEDURE — 3023F SPIROM DOC REV: CPT | Performed by: FAMILY MEDICINE

## 2018-08-16 PROCEDURE — 1101F PT FALLS ASSESS-DOCD LE1/YR: CPT | Performed by: FAMILY MEDICINE

## 2018-08-16 PROCEDURE — 3017F COLORECTAL CA SCREEN DOC REV: CPT | Performed by: FAMILY MEDICINE

## 2018-08-16 PROCEDURE — 4040F PNEUMOC VAC/ADMIN/RCVD: CPT | Performed by: FAMILY MEDICINE

## 2018-08-16 PROCEDURE — 4004F PT TOBACCO SCREEN RCVD TLK: CPT | Performed by: FAMILY MEDICINE

## 2018-08-16 PROCEDURE — G8427 DOCREV CUR MEDS BY ELIG CLIN: HCPCS | Performed by: FAMILY MEDICINE

## 2018-08-16 PROCEDURE — G8417 CALC BMI ABV UP PARAM F/U: HCPCS | Performed by: FAMILY MEDICINE

## 2018-08-16 RX ORDER — ETODOLAC 500 MG/1
500 TABLET, FILM COATED ORAL 2 TIMES DAILY
Qty: 60 TABLET | Refills: 2 | Status: SHIPPED | OUTPATIENT
Start: 2018-08-16 | End: 2019-01-01 | Stop reason: SDUPTHER

## 2018-08-16 ASSESSMENT — ENCOUNTER SYMPTOMS
RESPIRATORY NEGATIVE: 1
GASTROINTESTINAL NEGATIVE: 1
BACK PAIN: 1

## 2018-08-16 NOTE — PROGRESS NOTES
Subjective:      Patient ID: Tanmay Croft is a 77 y.o. male. HPI:    Chief Complaint   Patient presents with    3 Month Follow-Up    Results     3 month eval.  Doing well overall. Since last visit, pt was seen by Dr. Justa Lucas. Needs right hip replacement but will hold off for now. Also referred to Dr. Sal Monae for his lumbar radicular pain, appt 8/27 for consultation. BP well controlled. BP Readings from Last 3 Encounters:   08/16/18 136/70   06/14/18 136/74   06/11/18 120/73     Weight is down a few lbs. Quit eating out, cooking at home now due to cost.   Wt Readings from Last 3 Encounters:   08/16/18 189 lb 14.4 oz (86.1 kg)   06/14/18 194 lb 4.8 oz (88.1 kg)   06/11/18 196 lb (88.9 kg)     COPD stable. Mood well controlled. Patient Active Problem List   Diagnosis    HTN (hypertension)    Hyperlipidemia    Paranoia ([facility]    COPD (chronic obstructive pulmonary disease) ([facility]    IFG (impaired fasting glucose)    Bipolar I disorder, moderate, current or most recent episode depressed, in full remission [facility])     Past Surgical History:   Procedure Laterality Date    FOOT SURGERY Right 11/23/2009    LEG [facility] Right @ 2-1 yrs old   [facility]  @ 27 y[facility]     Prior to Admission medications    Medication Sig Start Date End Date Taking?  Authorizing Provider   OLANZapine-FLUoxetine HCl 12-50 MG CAPS Take 1 capsule by mouth nightly 6/11/18  Yes Francisco Feliz APRN - CNP   amLODIPine-benazepril (LOTREL) 5-10 MG per capsule TAKE 1 CAPSULE DAILY 10/9/17  Yes Vivica Patient, DO   albuterol-ipratropium (COMBIVENT RESPIMAT)  MCG/ACT AERS inhaler Inhale 1 puff into the lungs every 6 hours as needed for Wheezing or Shortness of Breath 10/9/17  Yes Vivica Patient, DO   FIBER FORMULA CAPS Take 160 mg by mouth daily   Yes Historical Provider, MD   ibuprofen (ADVIL;MOTRIN) 400 MG tablet Take 1 tablet by mouth every 8 hours as needed for Pain 6/5/18   Durga Jacobsen MD 02/24/2014           Review of Systems   Constitutional: Negative. HENT: Negative. Respiratory: Negative. Cardiovascular: Negative. Gastrointestinal: Negative. Musculoskeletal: Positive for arthralgias (right hip pain), back pain and gait problem. All other systems reviewed and are negative. Objective:   Physical Exam   Constitutional: He is oriented to person, place, and time. He appears well-developed and well-nourished. HENT:   Head: Normocephalic and atraumatic. Right Ear: Tympanic membrane normal.   Left Ear: Tympanic membrane normal.   Mouth/Throat: Oropharynx is clear and moist and mucous membranes are normal.   Neck: Carotid bruit is not present. Cardiovascular: Normal rate, regular rhythm and normal heart sounds. No murmur heard. Pulmonary/Chest: Effort normal and breath sounds normal.   Abdominal: Soft. Bowel sounds are normal.   Musculoskeletal: He exhibits no edema. Right hip: He exhibits decreased range of motion. Neurological: He is alert and oriented to person, place, and time. Gait (antalgic gait, walks with aid of cane) abnormal.   Skin: Skin is warm and dry. Psychiatric: He has a normal mood and affect. His behavior is normal.   Nursing note and vitals reviewed. Assessment:       Diagnosis Orders   1. Essential hypertension     2. Chronic obstructive pulmonary disease, unspecified COPD type ([address]     3. IFG (impaired fasting glucose)     4. Other hyperlipidemia     5. Bipolar I disorder, moderate, current or most recent episode depressed, in full remission ([address]     6. Primary osteoarthritis of right hip  etodolac (LODINE) 500 MG tablet   7. DDD (degenerative disc disease), lumbar     8. Lumbar radicular pain     9.  Vitamin D deficiency  vitamin D-3 (CHOLECALCIFEROL) 5000 units TABS           Plan:      -  Chronic medical problems stable  -  Continue current medications  -  Follow up with specialists as scheduled  -  Start Lodine for arthritic pain  - Start Vit D3  -  Labs reviewed, cholesterol ok off the simvastatin.   Will continue to hold due to myalgias  -  RTO 6 mos        Yamile Finney,

## 2018-08-16 NOTE — PATIENT INSTRUCTIONS
You may receive a survey about your visit with us today. The feedback from our patients helps us identify what is working well and where the service to all patients can be enhanced. Thank you! Tobacco Cessation Programs     Telephonic behavior modification  Sophie Spangler ([phone])   Counseling service for those who are ready to quit using tobacco.     Available for uninsured [place] residents, [place] recipients, pregnant women, or patients whose health plans or employers are members of the [address] [place] [address] behavior modification   http://[place]. [employer]uitlogix. org   Online support program to help patients through each step of the quitting process. Available 24 hours a day 7 days a week. Provides up to date researched based tool, step-by-step guides, and motivational messages. Online behavior modification   www.lungusa.org/stop-smoking/how-to-quit   HelpLine: [facility] (342-5704)   Email questions to:  [email or web address]. [employer]    Website offers resources to help tobacco users figure out their reasons for quitting and then take the big step of quitting for good. Hypnosis   Location: [facility], [facility], [place]   Contact: Solis Ansari, PhD at [phone]   Hypnosis for tobacco cessation   Cost $225 for the initial session and $175 for each session afterwards. Most patients require 6-8 sessions. There is the option to submit through the patients insurance. Hypnosis and behavior modification   Location: [facility],  [address] [facility] [employer] [facility], [place]   Contact: Alhaji Barnes, PhD at [phone]  Enedelia Streeter Counseling and hypnosis for nicotine addition   Cost: For uninsured patients:  Please call above phone number  Cost for insured patients depends on patients insurance plan.     Behavior modification   Location: [facility], [address] [facility] [address] JENIFFER [place]TEGA AM [employer] [address].[place], [address]: [address] four one-on-one appointments between the patient and a respiratory therapist.  The four appointments span over three weeks. The respiratory therapist schedules one of the appointments to occur 48 hours after the patients quit date.  Cost $100 total for the four sessions.   Tobacco cessation products are not included in the cost and are not provided by [facility].     Amy Londono

## 2018-08-16 NOTE — PROGRESS NOTES
03/07/1998    Low dose CT lung screening  03/07/2003    Flu vaccine (1) 09/01/2018    Potassium monitoring  04/21/2019    Creatinine monitoring  04/21/2019    A1C test (Diabetic or Prediabetic)  07/10/2019    Lipid screen  07/10/2023    Colon cancer screen colonoscopy  09/03/2024    Pneumococcal low/med risk  Completed

## 2018-09-05 DIAGNOSIS — F31.76 BIPOLAR I DISORDER, MODERATE, CURRENT OR MOST RECENT EPISODE DEPRESSED, IN FULL REMISSION (HCC): ICD-10-CM

## 2018-09-05 RX ORDER — OLANZAPINE AND FLUOXETINE 12; 50 MG/1; MG/1
CAPSULE ORAL
Qty: 90 CAPSULE | Refills: 0 | Status: SHIPPED | OUTPATIENT
Start: 2018-09-05 | End: 2018-09-11 | Stop reason: SDUPTHER

## 2018-09-11 ENCOUNTER — OFFICE VISIT (OUTPATIENT)
Dept: PSYCHIATRY | Age: 68
End: 2018-09-11
Payer: MEDICARE

## 2018-09-11 VITALS
SYSTOLIC BLOOD PRESSURE: 154 MMHG | BODY MASS INDEX: 31.96 KG/M2 | WEIGHT: 198 LBS | HEART RATE: 92 BPM | DIASTOLIC BLOOD PRESSURE: 81 MMHG

## 2018-09-11 DIAGNOSIS — F31.76 BIPOLAR I DISORDER, MODERATE, CURRENT OR MOST RECENT EPISODE DEPRESSED, IN FULL REMISSION (HCC): ICD-10-CM

## 2018-09-11 PROCEDURE — 4004F PT TOBACCO SCREEN RCVD TLK: CPT | Performed by: NURSE PRACTITIONER

## 2018-09-11 PROCEDURE — 3017F COLORECTAL CA SCREEN DOC REV: CPT | Performed by: NURSE PRACTITIONER

## 2018-09-11 PROCEDURE — G8417 CALC BMI ABV UP PARAM F/U: HCPCS | Performed by: NURSE PRACTITIONER

## 2018-09-11 PROCEDURE — 1123F ACP DISCUSS/DSCN MKR DOCD: CPT | Performed by: NURSE PRACTITIONER

## 2018-09-11 PROCEDURE — G8427 DOCREV CUR MEDS BY ELIG CLIN: HCPCS | Performed by: NURSE PRACTITIONER

## 2018-09-11 PROCEDURE — 4040F PNEUMOC VAC/ADMIN/RCVD: CPT | Performed by: NURSE PRACTITIONER

## 2018-09-11 PROCEDURE — 99213 OFFICE O/P EST LOW 20 MIN: CPT | Performed by: NURSE PRACTITIONER

## 2018-09-11 PROCEDURE — 1101F PT FALLS ASSESS-DOCD LE1/YR: CPT | Performed by: NURSE PRACTITIONER

## 2018-09-11 RX ORDER — OLANZAPINE AND FLUOXETINE 12; 50 MG/1; MG/1
CAPSULE ORAL
Qty: 90 CAPSULE | Refills: 0 | Status: SHIPPED | OUTPATIENT
Start: 2018-09-11 | End: 2018-12-04 | Stop reason: SDUPTHER

## 2018-09-11 NOTE — PROGRESS NOTES
regarding his hip pain to discuss the treatment. Patient is encouraged to utilize nonpharmacologic coping skills when necessary. Patient encouraged to utilize deep breathing, meditation, calming music, guided imagery, muscle relaxation, or drooling. Patient is encouraged to stop smoking and smoking cessation techniques were reviewed. Risks, potential side effects, possible drug-drug interactions, benefits and alternate treatments discussed in detail. All questions answered. Patient stated understanding and is agreeable to treatment plan. Patient has been instructed to seek emergency help via the emergency and/or calling 911 should symptoms become severe, worsen, or with other concerning symptoms. Patient instructed to go immediately to the emergency room and/or call 911 with any suicidal or homicidal ideations or if audio/visual hallucinations develop  Patient stated understanding and agrees. Patient given crisis center information. Provider Signature:  Electronically signed by EDINSON Patel CNP on 9/11/2018 at 2:01 PM  **This report has been created using voice recognition software. It may contain minor errors which are inherent in voice recognition technology. **

## 2018-10-16 DIAGNOSIS — I10 ESSENTIAL HYPERTENSION: ICD-10-CM

## 2018-10-16 RX ORDER — AMLODIPINE BESYLATE AND BENAZEPRIL HYDROCHLORIDE 5; 10 MG/1; MG/1
CAPSULE ORAL
Qty: 90 CAPSULE | Refills: 3 | Status: SHIPPED | OUTPATIENT
Start: 2018-10-16 | End: 2019-01-01 | Stop reason: SDUPTHER

## 2018-10-16 RX ORDER — AMLODIPINE BESYLATE AND BENAZEPRIL HYDROCHLORIDE 5; 10 MG/1; MG/1
CAPSULE ORAL
Qty: 90 CAPSULE | Refills: 3 | OUTPATIENT
Start: 2018-10-16

## 2018-12-04 DIAGNOSIS — F31.76 BIPOLAR I DISORDER, MODERATE, CURRENT OR MOST RECENT EPISODE DEPRESSED, IN FULL REMISSION (HCC): ICD-10-CM

## 2018-12-04 RX ORDER — OLANZAPINE AND FLUOXETINE 12; 50 MG/1; MG/1
CAPSULE ORAL
Qty: 90 CAPSULE | Refills: 0 | Status: SHIPPED | OUTPATIENT
Start: 2018-12-04 | End: 2018-12-13 | Stop reason: SDUPTHER

## 2018-12-13 ENCOUNTER — OFFICE VISIT (OUTPATIENT)
Dept: PSYCHIATRY | Age: 68
End: 2018-12-13
Payer: MEDICARE

## 2018-12-13 VITALS — BODY MASS INDEX: 32.14 KG/M2 | HEIGHT: 66 IN | WEIGHT: 200 LBS

## 2018-12-13 DIAGNOSIS — F31.76 BIPOLAR I DISORDER, MODERATE, CURRENT OR MOST RECENT EPISODE DEPRESSED, IN FULL REMISSION (HCC): Primary | ICD-10-CM

## 2018-12-13 PROCEDURE — 3017F COLORECTAL CA SCREEN DOC REV: CPT | Performed by: NURSE PRACTITIONER

## 2018-12-13 PROCEDURE — G8484 FLU IMMUNIZE NO ADMIN: HCPCS | Performed by: NURSE PRACTITIONER

## 2018-12-13 PROCEDURE — 99213 OFFICE O/P EST LOW 20 MIN: CPT | Performed by: NURSE PRACTITIONER

## 2018-12-13 PROCEDURE — 1123F ACP DISCUSS/DSCN MKR DOCD: CPT | Performed by: NURSE PRACTITIONER

## 2018-12-13 PROCEDURE — G8417 CALC BMI ABV UP PARAM F/U: HCPCS | Performed by: NURSE PRACTITIONER

## 2018-12-13 PROCEDURE — 4040F PNEUMOC VAC/ADMIN/RCVD: CPT | Performed by: NURSE PRACTITIONER

## 2018-12-13 PROCEDURE — 4004F PT TOBACCO SCREEN RCVD TLK: CPT | Performed by: NURSE PRACTITIONER

## 2018-12-13 PROCEDURE — G8427 DOCREV CUR MEDS BY ELIG CLIN: HCPCS | Performed by: NURSE PRACTITIONER

## 2018-12-13 PROCEDURE — 1101F PT FALLS ASSESS-DOCD LE1/YR: CPT | Performed by: NURSE PRACTITIONER

## 2018-12-13 RX ORDER — OLANZAPINE AND FLUOXETINE 12; 50 MG/1; MG/1
CAPSULE ORAL
Qty: 90 CAPSULE | Refills: 1 | Status: SHIPPED | OUTPATIENT
Start: 2018-12-13 | End: 2019-01-01 | Stop reason: SDUPTHER

## 2018-12-13 NOTE — PROGRESS NOTES
[facility] PROFESSIONAL SERVS  [facility]  [address] [employer]  [address]  Dept: [phone]  Dept Fax: [phone]: [phone]    Visit Date: 12/13/2018    SUBJECTIVE DATA     CHIEF COMPLAINT:    Chief Complaint   Patient presents with   [address] Problem    3 Month Follow-Up    Medication Refill       History obtained from: patient    HISTORY OF PRESENT ILLNESS:    Delaney Russell is a 77 y.o. male who presents to the office for follow-up on his bipolar disorder and for medication refills. States his family has suggest he go into assisted living, but he doesn't want to do that  Has a roommate now  -the roommate helps him with chores     Planning to see a specialist to have his back fixed and hip replacement  -states he was told to have his back fixed first    Missed his medications for a couple of days  -had some increased anxiety  -resumed medications and symptoms resolved    Mood is doing well  -denies feeling down, sad, depressed  -reports good motivation and interest in activities  -sleeping well  -denies any irritability  -denies marlin or hypomania    Patient denies suicidal ideations, intent, plan. No homicidal ideations, intent, plan. No audio or visual hallucinations. HPI    Adverse reactions from psychotropic medications:  None at this time      Current Psychiatric Review of Systems         Marlin or Hypomania:  No     Panic Attacks:  no     Phobias:  no     Obsessions and Compulsions:  no     Body or Vocal Tics:  no     Hallucinations:  no     Delusions:  no    SOCIAL HISTORY:  Patient was born in [place], [place] and raised by his biological parents      Social History     Social History    Marital status:      Spouse name: N/A    Number of children: 1    Years of education: N/A     Occupational History    Not on file.      Social History Main Topics    Smoking status: Current Every Day Smoker     Packs/day: 1.50     Years: 45.00  Hayfever     Hyperlipidemia     Hypertension     Snores        PAST SURGICAL HISTORY:    Past Surgical History:   Procedure Laterality Date    FOOT SURGERY Right 11/23/2009    LEG SURGERY Right @ 2-1 yrs old   [facility]  @ 27 yrs old       PREVIOUS MEDICATIONS:  Previous Medications    ALBUTEROL-IPRATROPIUM (COMBIVENT RESPIMAT)  MCG/ACT AERS INHALER    Inhale 1 puff into the lungs every 6 hours as needed for Wheezing or Shortness of Breath    AMLODIPINE-BENAZEPRIL (LOTREL) 5-10 MG PER CAPSULE    TAKE 1 CAPSULE DAILY    ETODOLAC (LODINE) 500 MG TABLET    Take 1 tablet by mouth 2 times daily    FIBER FORMULA CAPS    Take 160 mg by mouth daily    VITAMIN D-3 (CHOLECALCIFEROL) 5000 UNITS TABS    Take 1 tablet by mouth daily       ALLERGIES:    Patient has no known allergies. REVIEW OF SYSTEMS:    ROS    The patient sees Lalita Gonzalez DO as his primary care provider.     SPECIALISTS: None    OBJECTIVE DATA     Ht 5' 6\" (1.676 m)   Wt 200 lb (90.7 kg)   BMI 32.28 kg/m²      Results for orders placed or performed in visit on 06/14/18   Lipid Panel   Result Value Ref Range    Cholesterol 185 <200 mg/dL    Triglycerides 136 <150 mg/dL    HDL 42 >39 mg/dL    LDL Calculated 116 <130 mg/dL    VLDL Cholesterol Calculated 27 <30 mg/dL    LDl/HDL Ratio 2.8 <3.5    Chol/HDL Ratio 4.4 <5   Hemoglobin A1C   Result Value Ref Range    Hemoglobin A1C 6.1 (H) 4.2 - 5.8 %    AVERAGE GLUCOSE 128 (H) 66 - 114 mg/dL   Vitamin D 25 Hydroxy   Result Value Ref Range    Vit D, 25-Hydroxy 15 (L) SEE BELOW NG/ML       Physical Exam    Mental Status Evaluation:   Orientation: Alert, oriented, thought content appropriate   Mood:. euthymic      Affect:  mood-congruent      Appearance:  Well groomed, age appropriate and casually dressed   Activity:  Within Normal Limits   Gait/Posture: Using cane for ambulation; slight limp noted with gait   Speech:  normal pitch, normal volume and clear, linear, appropriate, easy to

## 2019-01-01 ENCOUNTER — OFFICE VISIT (OUTPATIENT)
Dept: PSYCHIATRY | Age: 69
End: 2019-01-01
Payer: MEDICARE

## 2019-01-01 ENCOUNTER — OFFICE VISIT (OUTPATIENT)
Dept: FAMILY MEDICINE CLINIC | Age: 69
End: 2019-01-01
Payer: MEDICARE

## 2019-01-01 ENCOUNTER — TELEPHONE (OUTPATIENT)
Dept: FAMILY MEDICINE CLINIC | Age: 69
End: 2019-01-01

## 2019-01-01 ENCOUNTER — HOSPITAL ENCOUNTER (OUTPATIENT)
Dept: INTERVENTIONAL RADIOLOGY/VASCULAR | Age: 69
Discharge: HOME | End: 2019-02-05
Payer: MEDICARE

## 2019-01-01 ENCOUNTER — TELEPHONE (OUTPATIENT)
Dept: CARDIOLOGY CLINIC | Age: 69
End: 2019-01-01

## 2019-01-01 ENCOUNTER — HOSPITAL ENCOUNTER (OUTPATIENT)
Dept: INTERVENTIONAL RADIOLOGY/VASCULAR | Age: 69
Discharge: HOME | End: 2019-06-27
Payer: MEDICARE

## 2019-01-01 ENCOUNTER — OFFICE VISIT (OUTPATIENT)
Dept: CARDIOLOGY CLINIC | Age: 69
End: 2019-01-01
Payer: MEDICARE

## 2019-01-01 ENCOUNTER — OFFICE VISIT (OUTPATIENT)
Dept: PULMONOLOGY | Age: 69
End: 2019-01-01
Payer: MEDICARE

## 2019-01-01 ENCOUNTER — HOSPITAL ENCOUNTER (OUTPATIENT)
Age: 69
Discharge: HOME | End: 2019-09-17
Payer: MEDICARE

## 2019-01-01 ENCOUNTER — TELEPHONE (OUTPATIENT)
Dept: UROLOGY | Age: 69
End: 2019-01-01

## 2019-01-01 ENCOUNTER — CARE COORDINATION (OUTPATIENT)
Dept: CASE MANAGEMENT | Age: 69
End: 2019-01-01

## 2019-01-01 ENCOUNTER — OFFICE VISIT (OUTPATIENT)
Dept: UROLOGY | Age: 69
End: 2019-01-01
Payer: MEDICARE

## 2019-01-01 ENCOUNTER — TELEPHONE (OUTPATIENT)
Dept: PSYCHIATRY | Age: 69
End: 2019-01-01

## 2019-01-01 ENCOUNTER — HOSPITAL ENCOUNTER (OUTPATIENT)
Dept: GENERAL RADIOLOGY | Age: 69
Discharge: HOME | End: 2019-09-17
Payer: MEDICARE

## 2019-01-01 ENCOUNTER — HOSPITAL ENCOUNTER (OUTPATIENT)
Dept: PULMONOLOGY | Age: 69
Discharge: HOME | End: 2019-09-17
Payer: MEDICARE

## 2019-01-01 ENCOUNTER — TELEPHONE (OUTPATIENT)
Dept: PULMONOLOGY | Age: 69
End: 2019-01-01

## 2019-01-01 ENCOUNTER — HOSPITAL ENCOUNTER (OUTPATIENT)
Dept: CT IMAGING | Age: 69
Discharge: HOME | End: 2019-02-05
Payer: MEDICARE

## 2019-01-01 ENCOUNTER — HOSPITAL ENCOUNTER (OUTPATIENT)
Age: 69
Discharge: HOME | End: 2019-12-27
Payer: MEDICARE

## 2019-01-01 ENCOUNTER — HOSPITAL ENCOUNTER (OUTPATIENT)
Age: 69
Discharge: HOME | End: 2019-07-08
Payer: MEDICARE

## 2019-01-01 VITALS
SYSTOLIC BLOOD PRESSURE: 130 MMHG | OXYGEN SATURATION: 97 % | DIASTOLIC BLOOD PRESSURE: 88 MMHG | RESPIRATION RATE: 20 BRPM | BODY MASS INDEX: 34.23 KG/M2 | TEMPERATURE: 97.6 F | WEIGHT: 213 LBS | HEIGHT: 66 IN | HEART RATE: 95 BPM

## 2019-01-01 VITALS
WEIGHT: 195.9 LBS | SYSTOLIC BLOOD PRESSURE: 134 MMHG | HEART RATE: 90 BPM | BODY MASS INDEX: 31.62 KG/M2 | RESPIRATION RATE: 24 BRPM | OXYGEN SATURATION: 97 % | DIASTOLIC BLOOD PRESSURE: 80 MMHG

## 2019-01-01 VITALS
BODY MASS INDEX: 32.3 KG/M2 | SYSTOLIC BLOOD PRESSURE: 136 MMHG | HEIGHT: 66 IN | DIASTOLIC BLOOD PRESSURE: 72 MMHG | WEIGHT: 201 LBS

## 2019-01-01 VITALS
WEIGHT: 200 LBS | SYSTOLIC BLOOD PRESSURE: 108 MMHG | DIASTOLIC BLOOD PRESSURE: 72 MMHG | HEIGHT: 66 IN | BODY MASS INDEX: 32.14 KG/M2

## 2019-01-01 VITALS
RESPIRATION RATE: 28 BRPM | BODY MASS INDEX: 31.39 KG/M2 | SYSTOLIC BLOOD PRESSURE: 106 MMHG | OXYGEN SATURATION: 98 % | WEIGHT: 194.5 LBS | HEART RATE: 80 BPM | DIASTOLIC BLOOD PRESSURE: 54 MMHG

## 2019-01-01 VITALS
BODY MASS INDEX: 32.91 KG/M2 | SYSTOLIC BLOOD PRESSURE: 120 MMHG | WEIGHT: 203.9 LBS | HEART RATE: 92 BPM | DIASTOLIC BLOOD PRESSURE: 60 MMHG | RESPIRATION RATE: 18 BRPM | OXYGEN SATURATION: 96 %

## 2019-01-01 VITALS
HEART RATE: 76 BPM | WEIGHT: 194.9 LBS | RESPIRATION RATE: 20 BRPM | DIASTOLIC BLOOD PRESSURE: 68 MMHG | SYSTOLIC BLOOD PRESSURE: 132 MMHG | BODY MASS INDEX: 31.32 KG/M2 | HEIGHT: 66 IN

## 2019-01-01 VITALS
HEART RATE: 84 BPM | OXYGEN SATURATION: 97 % | DIASTOLIC BLOOD PRESSURE: 82 MMHG | BODY MASS INDEX: 32.62 KG/M2 | HEIGHT: 66 IN | SYSTOLIC BLOOD PRESSURE: 124 MMHG | WEIGHT: 203 LBS | RESPIRATION RATE: 20 BRPM

## 2019-01-01 VITALS — WEIGHT: 208 LBS | BODY MASS INDEX: 33.57 KG/M2

## 2019-01-01 VITALS
DIASTOLIC BLOOD PRESSURE: 84 MMHG | WEIGHT: 202.8 LBS | BODY MASS INDEX: 32.73 KG/M2 | SYSTOLIC BLOOD PRESSURE: 136 MMHG | OXYGEN SATURATION: 98 % | HEART RATE: 92 BPM

## 2019-01-01 VITALS
BODY MASS INDEX: 34.17 KG/M2 | DIASTOLIC BLOOD PRESSURE: 64 MMHG | HEART RATE: 83 BPM | SYSTOLIC BLOOD PRESSURE: 132 MMHG | HEIGHT: 66 IN | WEIGHT: 212.6 LBS

## 2019-01-01 DIAGNOSIS — G47.10 HYPERSOMNIA: ICD-10-CM

## 2019-01-01 DIAGNOSIS — Z72.0 TOBACCO ABUSE: ICD-10-CM

## 2019-01-01 DIAGNOSIS — M79.605 LEG PAIN, BILATERAL: ICD-10-CM

## 2019-01-01 DIAGNOSIS — R06.00 DYSPNEA, UNSPECIFIED TYPE: ICD-10-CM

## 2019-01-01 DIAGNOSIS — F31.76 BIPOLAR I DISORDER, MODERATE, CURRENT OR MOST RECENT EPISODE DEPRESSED, IN FULL REMISSION (HCC): Primary | ICD-10-CM

## 2019-01-01 DIAGNOSIS — Z01.818 PRE-OP EVALUATION: Primary | ICD-10-CM

## 2019-01-01 DIAGNOSIS — R73.01 IFG (IMPAIRED FASTING GLUCOSE): ICD-10-CM

## 2019-01-01 DIAGNOSIS — F31.76 BIPOLAR I DISORDER, MODERATE, CURRENT OR MOST RECENT EPISODE DEPRESSED, IN FULL REMISSION (HCC): ICD-10-CM

## 2019-01-01 DIAGNOSIS — I10 ESSENTIAL HYPERTENSION: ICD-10-CM

## 2019-01-01 DIAGNOSIS — I87.2 VENOUS INSUFFICIENCY: ICD-10-CM

## 2019-01-01 DIAGNOSIS — M79.604 LEG PAIN, BILATERAL: ICD-10-CM

## 2019-01-01 DIAGNOSIS — J44.9 CHRONIC OBSTRUCTIVE PULMONARY DISEASE, UNSPECIFIED COPD TYPE (HCC): ICD-10-CM

## 2019-01-01 DIAGNOSIS — R35.0 URINARY FREQUENCY: Primary | ICD-10-CM

## 2019-01-01 DIAGNOSIS — Z82.49 FAMILY HISTORY OF PREMATURE CAD: ICD-10-CM

## 2019-01-01 DIAGNOSIS — M48.062 SPINAL STENOSIS OF LUMBAR REGION WITH NEUROGENIC CLAUDICATION: ICD-10-CM

## 2019-01-01 DIAGNOSIS — E78.00 PURE HYPERCHOLESTEROLEMIA: ICD-10-CM

## 2019-01-01 DIAGNOSIS — R31.29 MICROSCOPIC HEMATURIA: ICD-10-CM

## 2019-01-01 DIAGNOSIS — J44.9 CHRONIC OBSTRUCTIVE PULMONARY DISEASE, UNSPECIFIED COPD TYPE (HCC): Primary | ICD-10-CM

## 2019-01-01 DIAGNOSIS — R06.02 SOB (SHORTNESS OF BREATH) ON EXERTION: ICD-10-CM

## 2019-01-01 DIAGNOSIS — I73.9 PAD (PERIPHERAL ARTERY DISEASE) (HCC): ICD-10-CM

## 2019-01-01 DIAGNOSIS — J44.9 STAGE 1 MILD COPD BY GOLD CLASSIFICATION (HCC): ICD-10-CM

## 2019-01-01 DIAGNOSIS — Z87.891 PERSONAL HISTORY OF TOBACCO USE, PRESENTING HAZARDS TO HEALTH: ICD-10-CM

## 2019-01-01 DIAGNOSIS — I87.2 STASIS DERMATITIS OF BOTH LEGS: ICD-10-CM

## 2019-01-01 DIAGNOSIS — R68.2 DRY MOUTH, UNSPECIFIED: ICD-10-CM

## 2019-01-01 DIAGNOSIS — R35.0 URINARY FREQUENCY: ICD-10-CM

## 2019-01-01 DIAGNOSIS — R60.0 BILATERAL LEG EDEMA: ICD-10-CM

## 2019-01-01 DIAGNOSIS — I50.31 ACUTE DIASTOLIC CONGESTIVE HEART FAILURE (HCC): ICD-10-CM

## 2019-01-01 DIAGNOSIS — Z00.00 ROUTINE GENERAL MEDICAL EXAMINATION AT A HEALTH CARE FACILITY: Primary | ICD-10-CM

## 2019-01-01 DIAGNOSIS — Z87.891 PERSONAL HISTORY OF TOBACCO USE: ICD-10-CM

## 2019-01-01 DIAGNOSIS — N32.81 OAB (OVERACTIVE BLADDER): ICD-10-CM

## 2019-01-01 DIAGNOSIS — I50.31 ACUTE DIASTOLIC CONGESTIVE HEART FAILURE (HCC): Primary | ICD-10-CM

## 2019-01-01 DIAGNOSIS — R93.1 ABNORMAL ECHOCARDIOGRAM: Primary | ICD-10-CM

## 2019-01-01 DIAGNOSIS — Z12.5 SCREENING FOR PROSTATE CANCER: ICD-10-CM

## 2019-01-01 DIAGNOSIS — R32 URINARY INCONTINENCE, UNSPECIFIED TYPE: Primary | ICD-10-CM

## 2019-01-01 DIAGNOSIS — Z13.6 ENCOUNTER FOR SCREENING FOR VASCULAR DISEASE: ICD-10-CM

## 2019-01-01 DIAGNOSIS — R60.0 LOWER LEG EDEMA: ICD-10-CM

## 2019-01-01 DIAGNOSIS — R60.0 LOWER LEG EDEMA: Primary | ICD-10-CM

## 2019-01-01 DIAGNOSIS — R49.0 HOARSENESS OF VOICE: Primary | ICD-10-CM

## 2019-01-01 DIAGNOSIS — Z78.9 DECREASED ACTIVITIES OF DAILY LIVING (ADL): ICD-10-CM

## 2019-01-01 LAB
ALBUMIN SERPL-MCNC: 3.6 G/DL (ref 3.5–5.1)
ALP BLD-CCNC: 118 U/L (ref 38–126)
ALT SERPL-CCNC: 86 U/L (ref 11–66)
ANION GAP SERPL CALCULATED.3IONS-SCNC: 11 MEQ/L (ref 8–16)
ANION GAP SERPL CALCULATED.3IONS-SCNC: 13 MEQ/L (ref 8–16)
AST SERPL-CCNC: 56 U/L (ref 5–40)
AVERAGE GLUCOSE: 114 MG/DL (ref 70–126)
BACTERIA: ABNORMAL
BACTERIA: ABNORMAL
BASOPHILS # BLD: 0.7 %
BASOPHILS ABSOLUTE: 0.1 THOU/MM3 (ref 0–0.1)
BILIRUB SERPL-MCNC: 0.6 MG/DL (ref 0.3–1.2)
BILIRUBIN URINE: ABNORMAL
BILIRUBIN, POC: NEGATIVE
BLOOD URINE, POC: ABNORMAL
BLOOD, URINE: ABNORMAL
BLOOD, URINE: NEGATIVE
BUN BLDV-MCNC: 17 MG/DL (ref 7–22)
BUN BLDV-MCNC: 17 MG/DL (ref 7–22)
CALCIUM SERPL-MCNC: 9 MG/DL (ref 8.5–10.5)
CALCIUM SERPL-MCNC: 9.1 MG/DL (ref 8.5–10.5)
CASTS: ABNORMAL /LPF
CHARACTER, URINE: CLEAR
CHLORIDE BLD-SCNC: 101 MEQ/L (ref 98–111)
CHLORIDE BLD-SCNC: 106 MEQ/L (ref 98–111)
CHOLESTEROL, TOTAL: 192 MG/DL (ref 100–199)
CLARITY, POC: ABNORMAL
CO2: 24 MEQ/L (ref 23–33)
CO2: 27 MEQ/L (ref 23–33)
COLOR, POC: ABNORMAL
COLOR, URINE: YELLOW
COLOR, URINE: YELLOW
COLOR: YELLOW
COLOR: YELLOW
CREAT SERPL-MCNC: 0.7 MG/DL (ref 0.4–1.2)
CREAT SERPL-MCNC: 0.8 MG/DL (ref 0.4–1.2)
CRYSTALS: ABNORMAL
CRYSTALS: ABNORMAL
EOSINOPHIL # BLD: 1.8 %
EOSINOPHILS ABSOLUTE: 0.1 THOU/MM3 (ref 0–0.4)
EPITHELIAL CELLS, UA: ABNORMAL /HPF
EPITHELIAL CELLS, UA: ABNORMAL /HPF
ERYTHROCYTE [DISTWIDTH] IN BLOOD BY AUTOMATED COUNT: 13.2 % (ref 11.5–14.5)
ERYTHROCYTE [DISTWIDTH] IN BLOOD BY AUTOMATED COUNT: 50.6 FL (ref 35–45)
GFR SERPL CREATININE-BSD FRML MDRD: > 90 ML/MIN/1.73M2
GFR SERPL CREATININE-BSD FRML MDRD: > 90 ML/MIN/1.73M2
GLUCOSE BLD-MCNC: 109 MG/DL (ref 70–108)
GLUCOSE BLD-MCNC: 135 MG/DL (ref 70–108)
GLUCOSE URINE, POC: NEGATIVE
GLUCOSE URINE: NEGATIVE MG/DL
GLUCOSE URINE: NEGATIVE MG/DL
GLUCOSE, URINE: NEGATIVE MG/DL
GLUCOSE, URINE: NEGATIVE MG/DL
HBA1C MFR BLD: 5.8 % (ref 4.4–6.4)
HCT VFR BLD CALC: 51.5 % (ref 42–52)
HDLC SERPL-MCNC: 35 MG/DL
HEMOGLOBIN: 17.7 GM/DL (ref 14–18)
ICTOTEST: NEGATIVE
ICTOTEST: NEGATIVE
IMMATURE GRANS (ABS): 0.03 THOU/MM3 (ref 0–0.07)
IMMATURE GRANULOCYTES: 0.4 %
KETONES, POC: NEGATIVE
KETONES, URINE: NEGATIVE
LDL CHOLESTEROL CALCULATED: 127 MG/DL
LEUKOCYTE CLUMPS, URINE: NEGATIVE
LEUKOCYTE CLUMPS, URINE: NEGATIVE
LEUKOCYTE EST, POC: NEGATIVE
LEUKOCYTE ESTERASE, URINE: NEGATIVE
LEUKOCYTE ESTERASE, URINE: NEGATIVE
LYMPHOCYTES # BLD: 33.8 %
LYMPHOCYTES ABSOLUTE: 2.7 THOU/MM3 (ref 1–4.8)
MAGNESIUM: 2.1 MG/DL (ref 1.6–2.4)
MAGNESIUM: 2.3 MG/DL (ref 1.6–2.4)
MCH RBC QN AUTO: 35.5 PG (ref 26–33)
MCHC RBC AUTO-ENTMCNC: 34.4 GM/DL (ref 32.2–35.5)
MCV RBC AUTO: 103.2 FL (ref 80–94)
MISCELLANEOUS LAB TEST RESULT: ABNORMAL
MISCELLANEOUS LAB TEST RESULT: ABNORMAL
MONOCYTES # BLD: 6.6 %
MONOCYTES ABSOLUTE: 0.5 THOU/MM3 (ref 0.4–1.3)
NITRITE, POC: NEGATIVE
NITRITE, URINE: NEGATIVE
NUCLEATED RED BLOOD CELLS: 0 /100 WBC
PH UA: 5.5 (ref 5–9)
PH UA: 6 (ref 5–9)
PH, POC: 6
PH, URINE: 5.5 (ref 5–9)
PH, URINE: 6 (ref 5–9)
PLATELET # BLD: 193 THOU/MM3 (ref 130–400)
PMV BLD AUTO: 9.3 FL (ref 9.4–12.4)
POST VOID RESIDUAL (PVR): 21 ML
POST VOID RESIDUAL (PVR): 40 ML
POTASSIUM SERPL-SCNC: 4.4 MEQ/L (ref 3.5–5.2)
POTASSIUM SERPL-SCNC: 4.4 MEQ/L (ref 3.5–5.2)
PROSTATE SPECIFIC ANTIGEN: 2.09 NG/ML (ref 0–1)
PROTEIN UA: NEGATIVE MG/DL
PROTEIN UA: NEGATIVE MG/DL
PROTEIN, POC: NEGATIVE
PROTEIN, URINE: ABNORMAL MG/DL
PROTEIN, URINE: NEGATIVE MG/DL
RBC # BLD: 4.99 MILL/MM3 (ref 4.7–6.1)
RBC URINE: ABNORMAL /HPF
RBC URINE: ABNORMAL /HPF
RENAL EPITHELIAL, UA: ABNORMAL
RENAL EPITHELIAL, UA: ABNORMAL
SEG NEUTROPHILS: 56.7 %
SEGMENTED NEUTROPHILS ABSOLUTE COUNT: 4.6 THOU/MM3 (ref 1.8–7.7)
SODIUM BLD-SCNC: 138 MEQ/L (ref 135–145)
SODIUM BLD-SCNC: 144 MEQ/L (ref 135–145)
SPECIFIC GRAVITY UA: 1.01 (ref 1–1.03)
SPECIFIC GRAVITY UA: 1.02 (ref 1–1.03)
SPECIFIC GRAVITY, POC: 1.02
SPECIFIC GRAVITY, URINE: 1.02 (ref 1–1.03)
SPECIFIC GRAVITY, URINE: >= 1.03 (ref 1–1.03)
TOTAL PROTEIN: 7.6 G/DL (ref 6.1–8)
TRIGL SERPL-MCNC: 150 MG/DL (ref 0–199)
TSH SERPL DL<=0.05 MIU/L-ACNC: 2.28 UIU/ML (ref 0.4–4.2)
UROBILINOGEN, POC: ABNORMAL
UROBILINOGEN, URINE: 0.2 EU/DL (ref 0–1)
WBC # BLD: 8.1 THOU/MM3 (ref 4.8–10.8)
WBC UA: ABNORMAL /HPF
WBC UA: ABNORMAL /HPF
YEAST: ABNORMAL
YEAST: ABNORMAL

## 2019-01-01 PROCEDURE — G8427 DOCREV CUR MEDS BY ELIG CLIN: HCPCS | Performed by: FAMILY MEDICINE

## 2019-01-01 PROCEDURE — 4040F PNEUMOC VAC/ADMIN/RCVD: CPT | Performed by: FAMILY MEDICINE

## 2019-01-01 PROCEDURE — 4040F PNEUMOC VAC/ADMIN/RCVD: CPT | Performed by: NURSE PRACTITIONER

## 2019-01-01 PROCEDURE — 3017F COLORECTAL CA SCREEN DOC REV: CPT | Performed by: FAMILY MEDICINE

## 2019-01-01 PROCEDURE — G8926 SPIRO NO PERF OR DOC: HCPCS | Performed by: FAMILY MEDICINE

## 2019-01-01 PROCEDURE — G8484 FLU IMMUNIZE NO ADMIN: HCPCS | Performed by: INTERNAL MEDICINE

## 2019-01-01 PROCEDURE — G8417 CALC BMI ABV UP PARAM F/U: HCPCS | Performed by: INTERNAL MEDICINE

## 2019-01-01 PROCEDURE — G8417 CALC BMI ABV UP PARAM F/U: HCPCS | Performed by: NURSE PRACTITIONER

## 2019-01-01 PROCEDURE — 81003 URINALYSIS AUTO W/O SCOPE: CPT | Performed by: NURSE PRACTITIONER

## 2019-01-01 PROCEDURE — 99204 OFFICE O/P NEW MOD 45 MIN: CPT | Performed by: INTERNAL MEDICINE

## 2019-01-01 PROCEDURE — 3023F SPIROM DOC REV: CPT | Performed by: FAMILY MEDICINE

## 2019-01-01 PROCEDURE — 94060 EVALUATION OF WHEEZING: CPT

## 2019-01-01 PROCEDURE — 36415 COLL VENOUS BLD VENIPUNCTURE: CPT

## 2019-01-01 PROCEDURE — 1123F ACP DISCUSS/DSCN MKR DOCD: CPT | Performed by: NURSE PRACTITIONER

## 2019-01-01 PROCEDURE — 4004F PT TOBACCO SCREEN RCVD TLK: CPT | Performed by: NURSE PRACTITIONER

## 2019-01-01 PROCEDURE — 80061 LIPID PANEL: CPT

## 2019-01-01 PROCEDURE — G8428 CUR MEDS NOT DOCUMENT: HCPCS | Performed by: NURSE PRACTITIONER

## 2019-01-01 PROCEDURE — 71046 X-RAY EXAM CHEST 2 VIEWS: CPT

## 2019-01-01 PROCEDURE — 51798 US URINE CAPACITY MEASURE: CPT | Performed by: NURSE PRACTITIONER

## 2019-01-01 PROCEDURE — 4004F PT TOBACCO SCREEN RCVD TLK: CPT | Performed by: FAMILY MEDICINE

## 2019-01-01 PROCEDURE — G8427 DOCREV CUR MEDS BY ELIG CLIN: HCPCS | Performed by: NURSE PRACTITIONER

## 2019-01-01 PROCEDURE — 99213 OFFICE O/P EST LOW 20 MIN: CPT | Performed by: NURSE PRACTITIONER

## 2019-01-01 PROCEDURE — G0296 VISIT TO DETERM LDCT ELIG: HCPCS | Performed by: INTERNAL MEDICINE

## 2019-01-01 PROCEDURE — 1123F ACP DISCUSS/DSCN MKR DOCD: CPT | Performed by: FAMILY MEDICINE

## 2019-01-01 PROCEDURE — 84443 ASSAY THYROID STIM HORMONE: CPT

## 2019-01-01 PROCEDURE — 80048 BASIC METABOLIC PNL TOTAL CA: CPT

## 2019-01-01 PROCEDURE — 94729 DIFFUSING CAPACITY: CPT

## 2019-01-01 PROCEDURE — G8417 CALC BMI ABV UP PARAM F/U: HCPCS | Performed by: FAMILY MEDICINE

## 2019-01-01 PROCEDURE — 99214 OFFICE O/P EST MOD 30 MIN: CPT | Performed by: FAMILY MEDICINE

## 2019-01-01 PROCEDURE — 99213 OFFICE O/P EST LOW 20 MIN: CPT | Performed by: FAMILY MEDICINE

## 2019-01-01 PROCEDURE — G8484 FLU IMMUNIZE NO ADMIN: HCPCS | Performed by: NURSE PRACTITIONER

## 2019-01-01 PROCEDURE — 9900000021 US VASCULAR SCREENING

## 2019-01-01 PROCEDURE — 84153 ASSAY OF PSA TOTAL: CPT

## 2019-01-01 PROCEDURE — 4040F PNEUMOC VAC/ADMIN/RCVD: CPT | Performed by: INTERNAL MEDICINE

## 2019-01-01 PROCEDURE — 3017F COLORECTAL CA SCREEN DOC REV: CPT | Performed by: NURSE PRACTITIONER

## 2019-01-01 PROCEDURE — 1123F ACP DISCUSS/DSCN MKR DOCD: CPT | Performed by: INTERNAL MEDICINE

## 2019-01-01 PROCEDURE — G8427 DOCREV CUR MEDS BY ELIG CLIN: HCPCS | Performed by: INTERNAL MEDICINE

## 2019-01-01 PROCEDURE — 3017F COLORECTAL CA SCREEN DOC REV: CPT | Performed by: INTERNAL MEDICINE

## 2019-01-01 PROCEDURE — 99214 OFFICE O/P EST MOD 30 MIN: CPT | Performed by: NURSE PRACTITIONER

## 2019-01-01 PROCEDURE — 83036 HEMOGLOBIN GLYCOSYLATED A1C: CPT

## 2019-01-01 PROCEDURE — 83735 ASSAY OF MAGNESIUM: CPT

## 2019-01-01 PROCEDURE — G0439 PPPS, SUBSEQ VISIT: HCPCS | Performed by: FAMILY MEDICINE

## 2019-01-01 PROCEDURE — 80053 COMPREHEN METABOLIC PANEL: CPT

## 2019-01-01 PROCEDURE — 81003 URINALYSIS AUTO W/O SCOPE: CPT | Performed by: FAMILY MEDICINE

## 2019-01-01 PROCEDURE — 93970 EXTREMITY STUDY: CPT

## 2019-01-01 PROCEDURE — 1101F PT FALLS ASSESS-DOCD LE1/YR: CPT | Performed by: FAMILY MEDICINE

## 2019-01-01 PROCEDURE — G8926 SPIRO NO PERF OR DOC: HCPCS | Performed by: INTERNAL MEDICINE

## 2019-01-01 PROCEDURE — 93000 ELECTROCARDIOGRAM COMPLETE: CPT | Performed by: INTERNAL MEDICINE

## 2019-01-01 PROCEDURE — G8484 FLU IMMUNIZE NO ADMIN: HCPCS | Performed by: FAMILY MEDICINE

## 2019-01-01 PROCEDURE — G0297 LDCT FOR LUNG CA SCREEN: HCPCS

## 2019-01-01 PROCEDURE — 4004F PT TOBACCO SCREEN RCVD TLK: CPT | Performed by: INTERNAL MEDICINE

## 2019-01-01 PROCEDURE — 85025 COMPLETE CBC W/AUTO DIFF WBC: CPT

## 2019-01-01 PROCEDURE — 94726 PLETHYSMOGRAPHY LUNG VOLUMES: CPT

## 2019-01-01 PROCEDURE — 3023F SPIROM DOC REV: CPT | Performed by: INTERNAL MEDICINE

## 2019-01-01 RX ORDER — SPIRONOLACTONE AND HYDROCHLOROTHIAZIDE 25; 25 MG/1; MG/1
1 TABLET ORAL DAILY
Qty: 30 TABLET | Refills: 3 | Status: SHIPPED | OUTPATIENT
Start: 2019-01-01

## 2019-01-01 RX ORDER — TAMSULOSIN HYDROCHLORIDE 0.4 MG/1
0.4 CAPSULE ORAL DAILY
Qty: 30 CAPSULE | Refills: 3 | Status: SHIPPED | OUTPATIENT
Start: 2019-01-01 | End: 2020-01-01 | Stop reason: SDUPTHER

## 2019-01-01 RX ORDER — OXYBUTYNIN CHLORIDE 10 MG/1
10 TABLET, EXTENDED RELEASE ORAL DAILY
Qty: 30 TABLET | Refills: 2 | Status: SHIPPED | OUTPATIENT
Start: 2019-01-01 | End: 2019-01-01

## 2019-01-01 RX ORDER — ALBUTEROL SULFATE 90 UG/1
2 AEROSOL, METERED RESPIRATORY (INHALATION) EVERY 6 HOURS PRN
Qty: 1 INHALER | Refills: 2 | Status: SHIPPED | OUTPATIENT
Start: 2019-01-01

## 2019-01-01 RX ORDER — PREDNISONE 20 MG/1
20 TABLET ORAL 2 TIMES DAILY
Qty: 10 TABLET | Refills: 0 | Status: SHIPPED | OUTPATIENT
Start: 2019-01-01 | End: 2019-01-01

## 2019-01-01 RX ORDER — ETODOLAC 500 MG/1
500 TABLET, FILM COATED ORAL 2 TIMES DAILY
Qty: 180 TABLET | Refills: 3 | Status: SHIPPED | OUTPATIENT
Start: 2019-01-01 | End: 2020-01-01 | Stop reason: SDUPTHER

## 2019-01-01 RX ORDER — DOCUSATE SODIUM 100 MG/1
100 CAPSULE, LIQUID FILLED ORAL DAILY
COMMUNITY

## 2019-01-01 RX ORDER — OLANZAPINE AND FLUOXETINE 12; 50 MG/1; MG/1
CAPSULE ORAL
Qty: 90 CAPSULE | Refills: 3 | Status: SHIPPED | OUTPATIENT
Start: 2019-01-01

## 2019-01-01 RX ORDER — OLANZAPINE AND FLUOXETINE 12; 50 MG/1; MG/1
CAPSULE ORAL
Qty: 90 CAPSULE | Refills: 1 | Status: SHIPPED | OUTPATIENT
Start: 2019-01-01 | End: 2019-01-01 | Stop reason: SDUPTHER

## 2019-01-01 RX ORDER — AMLODIPINE BESYLATE AND BENAZEPRIL HYDROCHLORIDE 5; 10 MG/1; MG/1
CAPSULE ORAL
Qty: 90 CAPSULE | Refills: 4 | Status: SHIPPED | OUTPATIENT
Start: 2019-01-01

## 2019-01-01 RX ORDER — PREDNISONE 20 MG/1
20 TABLET ORAL 2 TIMES DAILY
Qty: 14 TABLET | Refills: 0 | Status: SHIPPED | OUTPATIENT
Start: 2019-01-01 | End: 2019-01-01

## 2019-01-01 RX ORDER — HYDROCODONE BITARTRATE AND ACETAMINOPHEN 5; 325 MG/1; MG/1
TABLET ORAL
Refills: 0 | COMMUNITY
Start: 2019-01-01 | End: 2019-01-01 | Stop reason: ALTCHOICE

## 2019-01-01 RX ORDER — ATORVASTATIN CALCIUM 10 MG/1
10 TABLET, FILM COATED ORAL NIGHTLY
Qty: 90 TABLET | Refills: 3 | Status: ON HOLD | OUTPATIENT
Start: 2019-01-01 | End: 2020-01-01

## 2019-01-01 ASSESSMENT — ENCOUNTER SYMPTOMS
RESPIRATORY NEGATIVE: 1
CHEST TIGHTNESS: 0
GASTROINTESTINAL NEGATIVE: 1
EYES NEGATIVE: 1
NAUSEA: 0
EYES NEGATIVE: 1
RESPIRATORY NEGATIVE: 1
RESPIRATORY NEGATIVE: 1
GASTROINTESTINAL NEGATIVE: 1
ABDOMINAL PAIN: 0
WHEEZING: 0
COUGH: 1
SORE THROAT: 0
COUGH: 1
GASTROINTESTINAL NEGATIVE: 1
BACK PAIN: 1
BACK PAIN: 0
SHORTNESS OF BREATH: 0
VOMITING: 0
ALLERGIC/IMMUNOLOGIC NEGATIVE: 1
GASTROINTESTINAL NEGATIVE: 1
CHEST TIGHTNESS: 0
RESPIRATORY NEGATIVE: 1
ABDOMINAL PAIN: 0
RESPIRATORY NEGATIVE: 1
SHORTNESS OF BREATH: 1
VOICE CHANGE: 1
TROUBLE SWALLOWING: 0
BACK PAIN: 0
COUGH: 1
GASTROINTESTINAL NEGATIVE: 1

## 2019-01-01 ASSESSMENT — PATIENT HEALTH QUESTIONNAIRE - PHQ9
SUM OF ALL RESPONSES TO PHQ QUESTIONS 1-9: 0
SUM OF ALL RESPONSES TO PHQ QUESTIONS 1-9: 0

## 2019-01-01 ASSESSMENT — LIFESTYLE VARIABLES: HOW OFTEN DO YOU HAVE A DRINK CONTAINING ALCOHOL: 0

## 2019-01-10 ENCOUNTER — OFFICE VISIT (OUTPATIENT)
Dept: FAMILY MEDICINE CLINIC | Age: 69
End: 2019-01-10
Payer: MEDICARE

## 2019-01-10 VITALS
HEART RATE: 92 BPM | WEIGHT: 199.4 LBS | RESPIRATION RATE: 24 BRPM | DIASTOLIC BLOOD PRESSURE: 66 MMHG | BODY MASS INDEX: 32.05 KG/M2 | SYSTOLIC BLOOD PRESSURE: 112 MMHG | HEIGHT: 66 IN

## 2019-01-10 DIAGNOSIS — Z12.5 SCREENING FOR PROSTATE CANCER: ICD-10-CM

## 2019-01-10 DIAGNOSIS — E78.00 PURE HYPERCHOLESTEROLEMIA: ICD-10-CM

## 2019-01-10 DIAGNOSIS — F22 PARANOIA (HCC): ICD-10-CM

## 2019-01-10 DIAGNOSIS — J44.9 CHRONIC OBSTRUCTIVE PULMONARY DISEASE, UNSPECIFIED COPD TYPE (HCC): Primary | ICD-10-CM

## 2019-01-10 DIAGNOSIS — R73.01 IFG (IMPAIRED FASTING GLUCOSE): ICD-10-CM

## 2019-01-10 DIAGNOSIS — Z72.0 TOBACCO ABUSE: ICD-10-CM

## 2019-01-10 DIAGNOSIS — I10 ESSENTIAL HYPERTENSION: ICD-10-CM

## 2019-01-10 DIAGNOSIS — Z87.891 PERSONAL HISTORY OF TOBACCO USE: ICD-10-CM

## 2019-01-10 DIAGNOSIS — F31.76 BIPOLAR I DISORDER, MODERATE, CURRENT OR MOST RECENT EPISODE DEPRESSED, IN FULL REMISSION (HCC): ICD-10-CM

## 2019-01-10 PROCEDURE — G8484 FLU IMMUNIZE NO ADMIN: HCPCS | Performed by: FAMILY MEDICINE

## 2019-01-10 PROCEDURE — 4040F PNEUMOC VAC/ADMIN/RCVD: CPT | Performed by: FAMILY MEDICINE

## 2019-01-10 PROCEDURE — G8427 DOCREV CUR MEDS BY ELIG CLIN: HCPCS | Performed by: FAMILY MEDICINE

## 2019-01-10 PROCEDURE — 3017F COLORECTAL CA SCREEN DOC REV: CPT | Performed by: FAMILY MEDICINE

## 2019-01-10 PROCEDURE — 99214 OFFICE O/P EST MOD 30 MIN: CPT | Performed by: FAMILY MEDICINE

## 2019-01-10 PROCEDURE — 1123F ACP DISCUSS/DSCN MKR DOCD: CPT | Performed by: FAMILY MEDICINE

## 2019-01-10 PROCEDURE — 3023F SPIROM DOC REV: CPT | Performed by: FAMILY MEDICINE

## 2019-01-10 PROCEDURE — 1101F PT FALLS ASSESS-DOCD LE1/YR: CPT | Performed by: FAMILY MEDICINE

## 2019-01-10 PROCEDURE — 4004F PT TOBACCO SCREEN RCVD TLK: CPT | Performed by: FAMILY MEDICINE

## 2019-01-10 PROCEDURE — G8417 CALC BMI ABV UP PARAM F/U: HCPCS | Performed by: FAMILY MEDICINE

## 2019-01-10 PROCEDURE — G8926 SPIRO NO PERF OR DOC: HCPCS | Performed by: FAMILY MEDICINE

## 2019-01-10 PROCEDURE — G0296 VISIT TO DETERM LDCT ELIG: HCPCS | Performed by: FAMILY MEDICINE

## 2019-01-10 RX ORDER — ACETAMINOPHEN 500 MG
1000 TABLET ORAL 3 TIMES DAILY PRN
COMMUNITY
End: 2020-01-01

## 2019-01-10 ASSESSMENT — ENCOUNTER SYMPTOMS
GASTROINTESTINAL NEGATIVE: 1
RESPIRATORY NEGATIVE: 1
BACK PAIN: 1

## 2019-02-12 PROBLEM — Z72.0 TOBACCO ABUSE: Status: ACTIVE | Noted: 2019-01-01

## 2019-04-05 NOTE — TELEPHONE ENCOUNTER
Chari with Dr. Nakul Elliott at [facility] called left vm stating we just saw pt 2/12/19 for a back surgery pre op. Dr. Elena Sr would like to do a right total hip on pt now and wants to know if he needs another pre op visit with us. Or can pt be cleared off the 2/12/19 visit. Please call Chari at [facility] back at [phone] ext [phone]. Please advise.

## 2019-06-13 NOTE — PROGRESS NOTES
[facility] PROFESSIONAL SERVS  [facility]  [address]  [address]  Dept: [phone]  Dept Fax: [phone]  Loc: [phone]    Visit Date: 6/13/2019    SUBJECTIVE DATA     CHIEF COMPLAINT:    Chief Complaint   Patient presents with    Depression    6 Month Follow-Up    Medication Refill       History obtained from: patient    HISTORY OF PRESENT ILLNESS:    Vick Esposito is a 68 y.o. male who presents to the office for follow-up on his bipolar disorder and for medication refills. He has been doing well. He is tolerating his medications well. He reports that is mood has been good and rates it 9/10 with 10 as best possible. His brother was diagnosed with liver and pancreatic cancer 2 months ago and is currently getting chemotherapy. He is worried about his brother's health but he is able to process the feelings well. He is living with two roommates and his nephew lives in his workshop, which is finished out. States he and his roommates get along very well. States the two rommates who live in his home are very helpful and supportive. He had a back surgery in February 2019 and right hip replacement in May 2019 and having people live with him has helped a lot. He has been walking with a walker since the surgeries and is slowly recovering; he was told to use the walker until June 30th. He will need his other hip replacement in the near future but he would like to wait until he recovers from the recent surgeries. He has been sleeping well but he has been sleeping in a chair since his hip surgery because he can't lay on his side in bed. He's not having any issues with anxiety. Denies symptoms of kiley or hypomania. States he is doing very well overall. Denies suicidal ideations, intent, plan. No homicidal ideations, intent, plan. No audiovisual hallucinations.     HPI    Adverse reactions from psychotropic medications:  None at this time      Current Psychiatric Review of Systems         Marlin or Hypomania:  No     Panic Attacks:  no     Phobias:  no     Obsessions and Compulsions:  no     Body or Vocal Tics:  no     Hallucinations:  no     Delusions:  no    SOCIAL HISTORY:  Patient was born in [place], [place] and raised by his biological parents      Social History     Socioeconomic History    Marital status:      Spouse name: Not on file    Number of children: 1    Years of education: Not on file    Highest education level: Not on file   Occupational History    Not on file   Social Needs    Financial resource strain: Not on file    Food insecurity:     Worry: Not on file     Inability: Not on file    Transportation needs:     Medical: Not on file     Non-medical: Not on file   Tobacco Use    Smoking status: Current Every Day Smoker     Packs/day: 1.50     Years: 45.00     Pack years: 67.50     Types: Cigars    Smokeless tobacco: Never Used   Substance and Sexual Activity    Alcohol use: No     Alcohol/week: 0.0 oz    Drug use: No    Sexual activity: Never   Lifestyle    Physical activity:     Days per week: Not on file     Minutes per session: Not on file    Stress: Not on file   Relationships    Social connections:     Talks on phone: Not on file     Gets together: Not on file     Attends [employer] service: Not on file     Active member of club or organization: Not on file     Attends meetings of clubs or organizations: Not on file     Relationship status: Not on file    Intimate partner violence:     Fear of current or ex partner: Not on file     Emotionally abused: Not on file     Physically abused: Not on file     Forced sexual activity: Not on file   Other Topics Concern    Not on file   Social History Narrative    12/13/2018    LEVEL OF EDUCATION: graduated high school    SPECIAL EDUCATION NEEDS: None    RESIDENCE: Currently lives with a roommate    LEGAL HISTORY: DUI in 2000 or 2001. Lost his 's license for 6 months.  Then after that he quit drinking alcohol completely. 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 Then worked at several different positions before retiring again in 2015 after working the previous 8 years for the [employer]. Patient states he wants to go find a job because \"I have too much time on my hands and I get bored sitting around at home. \" States he would like to work part-time    SUBSTANCE USE:    1. Alcohol: patient states \"I drank too much in the 70s. I regret it now but back then I didn't think I was drinking that much. \" Was drinking every day in the 70s before he got . After he  he only drank when they went to an event. States his last alcohol drink was in 2003. MARRIAGE: been  twice. First marriage at age 23 lasted for 3 years. Second marriage was for 10 or 11 years with the divorce finalized in 1989.     CHILDREN: one biological adult daughter         FAMILY HISTORY:   Family History   Problem Relation Age of Onset    Cancer Father         Prostate    Mental Illness Mother         \"nervous condition\"    Cancer Sister         throat    Heart Disease Brother         MI x2    Pancreatic Cancer Brother     Liver Cancer Brother     Cancer Maternal Grandfather     Heart Disease Maternal Grandfather        Psychiatric Family History  Mother had a \"nervous condition\"    PAST MEDICAL HISTORY:    Past Medical History:   Diagnosis Date    Arthritis     Emphysema     Hayfever     Hyperlipidemia     Hypertension     Snores        PAST SURGICAL HISTORY:    Past Surgical History:   Procedure Laterality Date    BACK SURGERY  02/2019    FOOT SURGERY Right 11/23/2009    LEG SURGERY Right @ 2-3 yrs old    TOTAL HIP ARTHROPLASTY Right 05/2019    VASECTOMY  @ 27 yrs old       PREVIOUS MEDICATIONS:  Previous Medications ACETAMINOPHEN (TYLENOL) 500 MG TABLET    Take 1,000 mg by mouth 3 times daily as needed for Pain    ALBUTEROL-IPRATROPIUM (COMBIVENT RESPIMAT)  MCG/ACT AERS INHALER    Inhale 1 puff into the lungs every 6 hours as needed for Wheezing or Shortness of Breath    AMLODIPINE-BENAZEPRIL (LOTREL) 5-10 MG PER CAPSULE    TAKE 1 CAPSULE DAILY    ETODOLAC (LODINE) 500 MG TABLET    Take 1 tablet by mouth 2 times daily    FIBER FORMULA CAPS    Take 160 mg by mouth daily    VITAMIN D-3 (CHOLECALCIFEROL) 5000 UNITS TABS    Take 1 tablet by mouth daily       ALLERGIES:    Patient has no known allergies. REVIEW OF SYSTEMS:    ROS    The patient sees Lin Mcgee DO as his primary care provider. SPECIALISTS: None    OBJECTIVE DATA     There were no vitals taken for this visit.      Results for orders placed or performed in visit on 06/14/18   Lipid Panel   Result Value Ref Range    Cholesterol 185 <200 mg/dL    Triglycerides 136 <150 mg/dL    HDL 42 >39 mg/dL    LDL Calculated 116 <130 mg/dL    VLDL Cholesterol Calculated 27 <30 mg/dL    LDl/HDL Ratio 2.8 <3.5    Chol/HDL Ratio 4.4 <5   Hemoglobin A1C   Result Value Ref Range    Hemoglobin A1C 6.1 (H) 4.2 - 5.8 %    AVERAGE GLUCOSE 128 (H) 66 - 114 mg/dL   Vitamin D 25 Hydroxy   Result Value Ref Range    Vit D, 25-Hydroxy 15 (L) SEE BELOW NG/ML       Physical Exam    Mental Status Evaluation:   Orientation: Alert, oriented, thought content appropriate   Mood:. euthymic      Affect:  mood-congruent      Appearance:  Well groomed, age appropriate and casually dressed   Activity:  Within Normal Limits   Gait/Posture: Using cane for ambulation; slight limp noted with gait   Speech:  normal pitch, normal volume and clear, linear, appropriate, easy to [employer] Process:  within normal limits   Thought Content:  within normal limits   Cognition:  grossly intact   Memory: intact   Insight:  good   Judgment: good   Suicidal Ideations: denies suicidal ideation Homicidal Ideations: Negative for homicidal ideation      Medication Side Effects: absent       Attention Span attention span and concentration were age appropriate     Screenings Completed in This Encounter:     Anxiety and Depression:                    DIAGNOSIS AND ASSESSMENT DATA     DIAGNOSIS:   1. Bipolar I disorder, moderate, current or most recent episode depressed, in full remission ([facility] [address]        PLAN   Follow-up:  Return in about 6 months (around 12/13/2019), or if symptoms worsen or fail to improve, for follow-up and medication management. Prescriptions for this encounter:  New Prescriptions    No medications on file       Orders Placed This Encounter   Medications    OLANZapine-FLUoxetine HCl 12-50 MG CAPS     Sig: TAKE 1 CAPSULE NIGHTLY     Dispense:  90 capsule     Refill:  1       Medications Discontinued During This Encounter   Medication Reason    OLANZapine-FLUoxetine HCl 12-50 MG CAPS REORDER       Additional orders:  No orders of the defined types were placed in this encounter. Patient continues to do well on his current medications. He will continue his current medications without changes. Patient is encouraged to utilize nonpharmacologic coping skills when necessary. Patient encouraged to utilize deep breathing, meditation, calming music, guided imagery, muscle relaxation, or drooling. Patient is encouraged to stop smoking and smoking cessation techniques were reviewed. Risks, potential side effects, possible drug-drug interactions, benefits and alternate treatments discussed in detail. All questions answered. Patient stated understanding and is agreeable to treatment plan. Patient has been instructed to seek emergency help via the emergency and/or calling 911 should symptoms become severe, worsen, or with other concerning symptoms.  Patient instructed to go immediately to the emergency room and/or call 911 with any suicidal or homicidal ideations or if audio/visual hallucinations develop  Patient stated understanding and agrees. Patient given crisis center information. Provider Signature:  Electronically signed by EDINSON Hollins CNP on 6/13/2019 at 1:23 PM  **This report has been created using voice recognition software. It may contain minor errors which are inherent in voice recognition technology. **

## 2019-06-27 NOTE — PATIENT INSTRUCTIONS
You may receive a survey about your visit with us today. The feedback from our patients helps us identify what is working well and where the service to all patients can be enhanced. Thank you! Tobacco Cessation Programs     Telephonic behavior modification  Brooke Kennedy ([phone])   Counseling service for those who are ready to quit using tobacco.     Available for uninsured [place] residents, [employer] recipients, pregnant women, or patients whose health plans or employers are members of the [address] [place] [address] behavior modification   http://[place]. Quitlogix. org   Online support program to help patients through each step of the quitting process. Available 24 hours a day 7 days a week. Provides up to date researched based tool, step-by-step guides, and motivational messages. Online behavior modification   www.lungusa.org/stop-smoking/how-to-quit   HelpLine: [facility] (816-9720)   Email questions to:  Rahel@[employer]. [employer]    Website offers resources to help tobacco users figure out their reasons for quitting and then take the big step of quitting for good. Hypnosis   Location: [facility], [place] [place] [place]   Contact: Lillie Clarke, PhD at [phone]   Hypnosis for tobacco cessation   Cost $225 for the initial session and $175 for each session afterwards. Most patients require 6-8 sessions. There is the option to submit through the patients insurance. Hypnosis and behavior modification   Location: [place] [address],  [address] [place] [address] [place]   Contact: Farideh Liriano, PhD at [phone]  [facility] Counseling and hypnosis for nicotine addition   Cost: For uninsured patients:  Please call above phone number  Cost for insured patients depends on patients insurance plan.     Behavior modification   Location: [facility], [address] [place], [address]: [address] four one-on-one appointments between the patient and a respiratory therapist.  The four appointments span over three weeks. The respiratory therapist schedules one of the appointments to occur 48 hours after the patients quit date.  Cost $100 total for the four sessions.   Tobacco cessation products are not included in the cost and are not provided by [facility].

## 2019-06-27 NOTE — PROGRESS NOTES
Subjective:      Patient ID: Army Santos is a 68 y.o. male. HPI:    Chief Complaint   Patient presents with    Leg Swelling     bilateral x 1 week     Pt here for leg swelling x 1 week. Pt has recently been sleeping in a recliner since his hip surgery. Was seen at [facility] today for follow up, LE dopplers scheduled after this appt. Weight stable. Wt Readings from Last 3 Encounters:   06/27/19 195 lb 14.4 oz (88.9 kg)   02/12/19 194 lb 14.4 oz (88.4 kg)   01/10/19 199 lb 6.4 oz (90.4 kg)     Patient Active Problem List   Diagnosis    HTN (hypertension)    Hyperlipidemia    Paranoia ([address]    COPD (chronic obstructive pulmonary disease) ([facility] [address]    IFG (impaired fasting glucose)    Bipolar I disorder, moderate, current or most recent episode depressed, in full remission ([facility] [address]    Tobacco abuse     Past Surgical History:   Procedure Laterality Date    BACK SURGERY  02/2019    FOOT SURGERY Right 11/23/2009    LEG SURGERY Right @ 2-3 yrs old    TOTAL HIP ARTHROPLASTY Right 05/2019    VASECTOMY  @ 27 yrs old     Prior to Admission medications    Medication Sig Start Date End Date Taking? Authorizing Provider   aspirin 325 MG EC tablet  5/2/19  Yes Historical Provider, MD   OLANZapine-FLUoxetine HCl 12-50 MG CAPS TAKE 1 CAPSULE NIGHTLY 6/13/19  Yes EDINSON Platt - CNP   etodolac (LODINE) 500 MG tablet Take 1 tablet by mouth 2 times daily 2/12/19  Yes Tamy Bernabe DO   acetaminophen (TYLENOL) 500 MG tablet Take 1,000 mg by mouth 3 times daily as needed for Pain   Yes Historical Provider, MD   amLODIPine-benazepril (LOTREL) 5-10 MG per capsule TAKE 1 CAPSULE DAILY 10/16/18  Yes Tamy Bernabe DO   vitamin D-3 (CHOLECALCIFEROL) 5000 units TABS Take 1 tablet by mouth daily 8/16/18  Yes Tamy Bernabe DO   FIBER FORMULA CAPS Take 160 mg by mouth daily   Yes Historical Provider, MD         Review of Systems   Constitutional: Negative. Negative for unexpected weight change.    HENT: Negative. Respiratory: Negative. Negative for chest tightness. Cardiovascular: Positive for leg swelling. Negative for chest pain. Gastrointestinal: Negative. Musculoskeletal: Negative. All other systems reviewed and are negative. Objective:   Physical Exam   Constitutional: He is oriented to person, place, and time. He appears well-developed and well-nourished. HENT:   Head: Normocephalic and atraumatic. Right Ear: Tympanic membrane normal.   Left Ear: Tympanic membrane normal.   Mouth/Throat: Oropharynx is clear and moist and mucous membranes are normal.   Cardiovascular: Normal rate, regular rhythm and normal heart sounds. No murmur heard. Pulmonary/Chest: Effort normal and breath sounds normal.   Abdominal: Soft. Bowel sounds are normal.   Musculoskeletal: He exhibits edema (bl LE edema with stasis changes. no evidence of cellulitis. the legs are tender to touch.). Neurological: He is alert and oriented to person, place, and time. Skin: Skin is warm and dry. Psychiatric: He has a normal mood and affect. His behavior is normal.   Nursing note and vitals reviewed. Assessment:       Diagnosis Orders   1. Lower leg edema     2. Stasis dermatitis of both legs     3.  Venous insufficiency             Plan:      -  Pt scheduled for LE dopplers right after today's visit which is appropriate in this setting  -  This is likely related to sleeping in hard-back chair for the last few weeks  -  He is now released to sleep in bed which should help  -  Recommend LE elevation as much as possible  -  LIBRADO on 7/10 for follow up        Karolina Liz DO

## 2019-07-02 NOTE — TELEPHONE ENCOUNTER
L/M for pt to call office. Called patient for pre visit planning. Pt has appt on 7/10/19 at 10:45am.  Please remind pt of appt date and time and check to see if they had their lab work done.

## 2019-07-06 NOTE — CARE COORDINATION
Name: Jorge Luis Garibay    ### Patient Details  YOB: 1950  MRN: [id number]    ### Encounter Details  Encounter ID: [id number]  Arrival Date: N/A  Discharge Date: N/A    ### Related interaction  COPD Low Touch UA (COPD Low Touch UA) (https://evolve. [employer]/interactions/[email or web address])    ### Required Interventions and Feedback     Call Status         *Call Status:     Patient Reached (edited by Meliza Aguilar on 07/06/2019 12:50 PM EDT)    Additional Call Status Details[de-identified]     Patient states that he is not home during the day. He does not get home till 4 pm.  (edited by Meliza Aguilar on 07/06/2019 12:50 PM EDT)     Unengaged Details         Reason patient was unengaged[de-identified]     Patient states that he is not home during the day. He does not get home till 4 pm. (edited by Meliza Aguilar on 07/06/2019 12:51 PM EDT)  Tyesha WOON RN. Opt Out    Patient's status following call?       *Unengaged in UA Call Status :     Opt Out (selected by VENKAT on 07/06/2019 12:50 PM EDT)

## 2019-07-10 NOTE — PROGRESS NOTES
Visit Information    Have you changed or started any medications since your last visit including any over-the-counter medicines, vitamins, or herbal medicines? no   Are you having any side effects from any of your medications? -  no  Have you stopped taking any of your medications? Is so, why? -  no    Have you seen any other physician or provider since your last visit? No  Have you had any other diagnostic tests since your last visit? Yes - Records Obtained  Have you been seen in the emergency room and/or had an admission to a hospital since we last saw you? No  Have you had your routine dental cleaning in the past 6 months? n/a    Have you activated your [employer] account? If not, what are your barriers?  No: declined     Patient Care Team:  Andres Friendly, DO as PCP - General (Family Medicine)  [facility], DO as PCP - [facility]  Susan Moura as Physician (Psychiatry)  Anusha Jaquez MD as Consulting Physician (Orthopedic Surgery)  Michelle Fitch MD as Consulting Physician (Orthopedic Surgery)    Medical History Review  Past Medical, Family, and Social History reviewed and does contribute to the patient presenting condition    Health Maintenance   Topic Date Due    Hepatitis C screen  1950    DTaP/Tdap/Td vaccine (1 - Tdap) 03/07/1967    Shingles Vaccine (1 of 2) 03/07/1998    Annual Wellness Visit (AWV)  10/09/2018    Flu vaccine (1) 09/01/2019    Low dose CT lung screening  02/05/2020    A1C test (Diabetic or Prediabetic)  07/08/2020    Potassium monitoring  07/08/2020    Creatinine monitoring  07/08/2020    Lipid screen  07/08/2024    Colon cancer screen colonoscopy  09/03/2024    Pneumococcal 65+ years Vaccine  Completed    AAA screen  Completed

## 2019-07-10 NOTE — PROGRESS NOTES
Catalina Fung DO   vitamin D-3 (CHOLECALCIFEROL) 5000 units TABS Take 1 tablet by mouth daily 8/16/18  Yes Catalina Fung DO       Lab Results   Component Value Date    LABA1C 5.8 07/08/2019     No results found for: EAG    No components found for: CHLPL  Lab Results   Component Value Date    TRIG 150 07/08/2019    TRIG 136 07/10/2018    TRIG 152 (H) 04/19/2018     Lab Results   Component Value Date    HDL 35 07/08/2019    HDL 42 07/10/2018    HDL 40 04/19/2018     Lab Results   Component Value Date    LDLCALC 127 07/08/2019    LDLCALC 116 07/10/2018    LDLCALC 120 04/19/2018     Lab Results   Component Value Date    LABVLDL 27 07/10/2018    LABVLDL 30 (H) 04/19/2018    LABVLDL 23 07/16/2015         Chemistry        Component Value Date/Time     07/08/2019 0913    K 4.4 07/08/2019 0913     07/08/2019 0913    CO2 24 07/08/2019 0913    BUN 17 07/08/2019 0913    CREATININE 0.7 07/08/2019 0913        Component Value Date/Time    CALCIUM 9.1 07/08/2019 0913    ALKPHOS 118 07/08/2019 0913    AST 56 (H) 07/08/2019 0913    ALT 86 (H) 07/08/2019 0913    BILITOT 0.6 07/08/2019 0913            Lab Results   Component Value Date    TSH 2.280 07/08/2019       Lab Results   Component Value Date    WBC 8.1 07/08/2019    HGB 17.7 07/08/2019    HCT 51.5 07/08/2019    .2 (H) 07/08/2019     07/08/2019         Health Maintenance   Topic Date Due    Hepatitis C screen  1950    DTaP/Tdap/Td vaccine (1 - Tdap) 03/07/1967    Shingles Vaccine (1 of 2) 03/07/1998    Annual Wellness Visit (AWV)  10/09/2018    Flu vaccine (1) 09/01/2019    Low dose CT lung screening  02/05/2020    A1C test (Diabetic or Prediabetic)  07/08/2020    Potassium monitoring  07/08/2020    Creatinine monitoring  07/08/2020    Lipid screen  07/08/2024    Colon cancer screen colonoscopy  09/03/2024    Pneumococcal 65+ years Vaccine  Completed    AAA screen  Completed       Immunization History   Administered Date(s) Administered    Influenza Vaccine, unspecified formulation 10/15/2014, 09/14/2015    Influenza, High Dose (Fluzone 65 yrs and older) 10/09/2017    Influenza, Lisa Son, 3 Years and older, IM (Fluzone 3 yrs and older or Afluria 5 yrs and older) 10/05/2016    Pneumococcal Conjugate 13-valent ([id number]) 07/15/2015    Pneumococcal Polysaccharide ([id number]) 02/24/2014         Review of Systems   Constitutional: Negative. Negative for fever. HENT: Negative. Respiratory: Positive for cough. Negative for chest tightness, shortness of breath and wheezing. Cardiovascular: Negative. Gastrointestinal: Negative. Musculoskeletal: Negative. All other systems reviewed and are negative. Objective:   Physical Exam   Constitutional: He is oriented to person, place, and time. He appears well-developed and well-nourished. HENT:   Head: Normocephalic and atraumatic. Right Ear: Tympanic membrane normal.   Left Ear: Tympanic membrane normal.   Mouth/Throat: Oropharynx is clear and moist and mucous membranes are normal.   Neck: Carotid bruit is not present. Cardiovascular: Normal rate, regular rhythm and normal heart sounds. No murmur heard. Pulmonary/Chest: Effort normal. He has decreased breath sounds. He has no wheezes. He has no rhonchi. Abdominal: Soft. Bowel sounds are normal.   Musculoskeletal: He exhibits no edema. Neurological: He is alert and oriented to person, place, and time. Skin: Skin is warm and dry. Psychiatric: He has a normal mood and affect. His behavior is normal.   Nursing note and vitals reviewed. Assessment:       Diagnosis Orders   1. Chronic obstructive pulmonary disease, unspecified COPD type ([facility])  albuterol sulfate HFA (VENTOLIN HFA) 108 (90 Base) MCG/ACT inhaler    predniSONE (DELTASONE) 20 MG tablet   2. Bipolar I disorder, moderate, current or most recent episode depressed, in full remission ([facility])  OLANZapine-FLUoxetine HCl 12-50 MG CAPS   3.  Lower leg

## 2019-07-10 NOTE — PATIENT INSTRUCTIONS
your [employer] account. Enter P501 in the [facility] box to learn more about \"Learning About High Blood Pressure. \"     If you do not have an account, please click on the \"Sign Up Now\" link. Current as of: July 22, 2018  Content Version: 12.0  © 4866-7950 Healthwise, [employer]. Care instructions adapted under license by [facility] ([facility]). If you have questions about a medical condition or this instruction, always ask your healthcare professional. [address] any warranty or liability for your use of this information.

## 2019-08-14 NOTE — PATIENT INSTRUCTIONS
respiratory therapist.  The four appointments span over three weeks. The respiratory therapist schedules one of the appointments to occur 48 hours after the patients quit date.  Cost $100 total for the four sessions. Tobacco cessation products are not included in the cost and are not provided by [facility].         Personalized Preventive Plan for Tien Pike - 8/14/2019  Medicare offers a range of preventive health benefits. Some of the tests and screenings are paid in full while other may be subject to a deductible, co-insurance, and/or copay. Some of these benefits include a comprehensive review of your medical history including lifestyle, illnesses that may run in your family, and various assessments and screenings as appropriate. After reviewing your medical record and screening and assessments performed today your provider may have ordered immunizations, labs, imaging, and/or referrals for you. A list of these orders (if applicable) as well as your Preventive Care list are included within your After Visit Summary for your review. Other Preventive Recommendations:    · A preventive eye exam performed by an eye specialist is recommended every 1-2 years to screen for glaucoma; cataracts, macular degeneration, and other eye disorders. · A preventive dental visit is recommended every 6 months. · Try to get at least 150 minutes of exercise per week or 10,000 steps per day on a pedometer . · Order or download the FREE \"Exercise & Physical Activity: Your Everyday Guide\" from [employer]. Call 6-839.782.9739 or search [employer] online. · You need 3878-0224 mg of calcium and 1501-1269 IU of vitamin D per day.  It is possible to meet your calcium requirement with diet alone, but a vitamin D supplement is usually necessary to meet this goal.  · When exposed to the sun, use a sunscreen that protects against both UVA and UVB radiation with an SPF of 30 or

## 2019-08-14 NOTE — PROGRESS NOTES
Subjective:      Patient ID: Oscar Hdez is a 68 y.o. male. HPI:    Chief Complaint   Patient presents with    Medicare AW     Annual eval.  Doing ok overall. Still struggling with urinary frequency, no relief with the Ditropan. Having constant urinary issues, urinated 4 times in the last 1/2 hr.  No dysuria, no abdominal pain. Breathing is a little labored today, this is not uncommon with exertion. He is still smoking, 1.5 ppd. BP well controlled. BP Readings from Last 3 Encounters:   08/14/19 124/82   08/05/19 136/84   07/10/19 (!) 106/54     Mood stabilized. Patient Active Problem List   Diagnosis    HTN (hypertension)    Hyperlipidemia    [facility]ia [facility]    COPD (chronic obstructive pulmonary disease) ([facility]    IFG (impaired fasting glucose)    Bipolar I disorder, moderate, current or most recent episode depressed, in full remission ([facility]    Tobacco abuse     Past Surgical History:   Procedure Laterality Date    BACK SURGERY  02/2019    FOOT SURGERY Right 11/23/2009    LEG SURGERY Right @ 2-3 yrs old    TOTAL HIP ARTHROPLASTY Right 05/2019    VASECTOMY  @ 27 yrs old     Prior to Admission medications    Medication Sig Start Date End Date Taking?  Authorizing Provider   oxybutynin (DITROPAN XL) 10 MG extended release tablet Take 1 tablet by mouth daily 8/5/19  Yes Jean-Pierre Merchant, DO   docusate sodium (COLACE) 100 MG capsule Take 100 mg by mouth daily   Yes Historical Provider, MD   OLANZapine-FLUoxetine HCl 12-50 MG CAPS TAKE 1 CAPSULE NIGHTLY 7/10/19  Yes Jean-Pierre Merchant, DO   aspirin 325 MG EC tablet Take 1 tablet by mouth daily 7/10/19  Yes Jean-Pierre Merchant, DO   atorvastatin (LIPITOR) 10 MG tablet Take 1 tablet by mouth nightly 7/10/19  Yes Jean-Pierre Merchant, DO   albuterol sulfate HFA (VENTOLIN HFA) 108 (90 Base) MCG/ACT inhaler Inhale 2 puffs into the lungs every 6 hours as needed for Wheezing or Shortness of Breath 7/10/19  Yes Jean-Pierre Merchant, DO  Hepatitis C screen  1950    Shingles Vaccine (2 of 3) 10/09/2015    Annual Wellness Visit (AWV)  10/09/2018    Flu vaccine (1) 09/01/2019    Low dose CT lung screening  02/05/2020    A1C test (Diabetic or Prediabetic)  07/08/2020    Potassium monitoring  07/08/2020    Creatinine monitoring  07/08/2020    Lipid screen  07/08/2024    Colon cancer screen colonoscopy  09/03/2024    DTaP/Tdap/Td vaccine (2 - Td) 08/14/2025    Pneumococcal 65+ years Vaccine  Completed    AAA screen  Completed     Recommendations for Preventive Services Due: see orders and patient instructions/AVS.  . Recommended screening schedule for the next 5-10 years is provided to the patient in written form: see Patient Instructions/AVS.    Sadie Vaughan was seen today for medicare awv.     Diagnoses and all orders for this visit:    Routine general medical examination at a health care facility    Urinary frequency  -     POCT Urinalysis No Micro (Auto)  -     Mirabegron ER (MYRBETRIQ) 25 MG TB24; Take 1 tablet by mouth daily  -     Gabriela Spear MD, Urology, [facility]    Dry mouth, unspecified    Microscopic hematuria  -     Cytology, urine  -     [facility] - Elise Hair MD, Urology, [facility]    Tobacco abuse  -     Gabriela Spear MD, Urology, [facility]    Chronic obstructive pulmonary disease, unspecified COPD type [facility])    Pure hypercholesterolemia    Essential hypertension    Spinal stenosis of lumbar region with neurogenic claudication    Venous insufficiency    IFG (impaired fasting glucose)    Bipolar I disorder, moderate, current or most recent episode depressed, in full remission ([facility]    OAB (overactive bladder)  -     Gabriela Spear MD, Urology, [facility]

## 2019-08-27 NOTE — PROGRESS NOTES
suicidal ideations, intent, plan. No homicidal ideations, intent, plan. No audiovisual hallucinations.     HPI    Adverse reactions from psychotropic medications:  None at this time      Current Psychiatric Review of Systems         Marlin or Hypomania:  No     Panic Attacks:  no     Phobias:  no     Obsessions and Compulsions:  no     Body or Vocal Tics:  no     Hallucinations:  no     Delusions:  no    SOCIAL HISTORY:  Patient was born in [place], [place] and raised by his biological parents      Social History     Socioeconomic History    Marital status:      Spouse name: Not on file    Number of children: 1    Years of education: Not on file    Highest education level: Not on file   Occupational History    Not on file   Social Needs    Financial resource strain: Not on file    Food insecurity:     Worry: Not on file     Inability: Not on file    Transportation needs:     Medical: Not on file     Non-medical: Not on file   Tobacco Use    Smoking status: Current Every Day Smoker     Packs/day: 1.50     Years: 45.00     Pack years: 67.50     Types: Cigars    Smokeless tobacco: Never Used   Substance and Sexual Activity    Alcohol use: No     Alcohol/week: 0.0 standard drinks    Drug use: No    Sexual activity: Never   Lifestyle    Physical activity:     Days per week: Not on file     Minutes per session: Not on file    Stress: Not on file   Relationships    Social connections:     Talks on phone: Not on file     Gets together: Not on file     Attends [employer] service: Not on file     Active member of club or organization: Not on file     Attends meetings of clubs or organizations: Not on file     Relationship status: Not on file    Intimate partner violence:     Fear of current or ex partner: Not on file     Emotionally abused: Not on file     Physically abused: Not on file     Forced sexual activity: Not on file   Other Topics Concern    Not on file   Social History Narrative    12/13/2018

## 2019-09-10 NOTE — PROGRESS NOTES
hours as needed for Wheezing or Shortness of Breath    AMLODIPINE-BENAZEPRIL (LOTREL) 5-10 MG PER CAPSULE    TAKE 1 CAPSULE DAILY    ASPIRIN 325 MG EC TABLET    Take 1 tablet by mouth daily    ATORVASTATIN (LIPITOR) 10 MG TABLET    Take 1 tablet by mouth nightly    DOCUSATE SODIUM (COLACE) 100 MG CAPSULE    Take 100 mg by mouth daily    ETODOLAC (LODINE) 500 MG TABLET    Take 1 tablet by mouth 2 times daily    MIRABEGRON ER (MYRBETRIQ) 50 MG TB24    Take 50 mg by mouth daily    OLANZAPINE-FLUOXETINE HCL 12-50 MG CAPS    TAKE 1 CAPSULE NIGHTLY    VITAMIN D-3 (CHOLECALCIFEROL) 5000 UNITS TABS    Take 1 tablet by mouth daily       ALLERGIES:    Patient has no known allergies. REVIEW OF SYSTEMS:    ROS    The patient sees Trevon Hernandez DO as his primary care provider. SPECIALISTS: None    OBJECTIVE DATA     Wt 208 lb (94.3 kg)   BMI 33.57 kg/m²        Physical Exam    Mental Status Evaluation:   Orientation: Alert, oriented, thought content appropriate   Mood:. euthymic      Affect:  mood-congruent      Appearance:  clean, clean shaven, well groomed, clean clothing appropriate for age and weather; age appropriate and casually dressed   Activity:  Within Normal Limits   Gait/Posture: Using cane for ambulation; slight limp noted with gait   Speech:  normal pitch, normal volume and clear, linear, appropriate, easy to [employer] Process:  within normal limits   Thought Content:  within normal limits   Cognition:  grossly intact   Memory: intact   Insight:  good   Judgment: good   Suicidal Ideations: denies suicidal ideation   Homicidal Ideations: Negative for homicidal ideation      Medication Side Effects: absent       Attention Span attention span and concentration were age appropriate     Screenings Completed in This Encounter:     Anxiety and Depression:                    DIAGNOSIS AND ASSESSMENT DATA     DIAGNOSIS:   1.  Bipolar I disorder, moderate, current or most recent episode depressed, in

## 2019-09-12 NOTE — PROGRESS NOTES
puffs into the lungs every 6 hours as needed for Wheezing or Shortness of Breath 7/10/19  Yes Jessi Butcher, DO   etodolac (LODINE) 500 MG tablet Take 1 tablet by mouth 2 times daily 2/12/19  Yes Ketanodor Guadalupe, DO   acetaminophen (TYLENOL) 500 MG tablet Take 1,000 mg by mouth 3 times daily as needed for Pain   Yes Historical Provider, MD   amLODIPine-benazepril (LOTREL) 5-10 MG per capsule TAKE 1 CAPSULE DAILY 10/16/18  Yes Jessi Butcher, DO   vitamin D-3 (CHOLECALCIFEROL) 5000 units TABS Take 1 tablet by mouth daily 8/16/18  Yes Theodsangeeta Butcher, DO         Review of Systems   Constitutional: Negative. HENT: Positive for voice change. Negative for sore throat and trouble swallowing. Respiratory: Positive for cough and shortness of breath. Cardiovascular: Negative. Gastrointestinal: Negative. Genitourinary: Positive for frequency. Musculoskeletal: Negative. All other systems reviewed and are negative. Objective:   Physical Exam   Constitutional: He is oriented to person, place, and time. He appears well-developed and well-nourished. HENT:   Head: Normocephalic and atraumatic. Right Ear: Tympanic membrane normal.   Left Ear: Tympanic membrane normal.   Mouth/Throat: Oropharynx is clear and moist and mucous membranes are normal.   Neck: Carotid bruit is not present. Cardiovascular: Normal rate, regular rhythm and normal heart sounds. No murmur heard. Pulmonary/Chest: Effort normal. He has decreased breath sounds. He has no wheezes. He has no rhonchi. Abdominal: Soft. Bowel sounds are normal.   Musculoskeletal: He exhibits no edema. Neurological: He is alert and oriented to person, place, and time. Skin: Skin is warm and dry. Psychiatric: He has a normal mood and affect. His behavior is normal.   Nursing note and vitals reviewed. Assessment:       Diagnosis Orders   1. Hoarseness of voice  predniSONE (DELTASONE) 20 MG tablet   2.  Dry mouth, unspecified

## 2019-12-23 PROBLEM — R60.0 BILATERAL LEG EDEMA: Status: ACTIVE | Noted: 2019-01-01

## 2019-12-23 PROBLEM — Z82.49 FAMILY HISTORY OF PREMATURE CAD: Status: ACTIVE | Noted: 2019-01-01

## 2019-12-23 PROBLEM — I50.31 ACUTE DIASTOLIC CONGESTIVE HEART FAILURE (HCC): Status: ACTIVE | Noted: 2019-01-01

## 2019-12-23 PROBLEM — R06.02 SOB (SHORTNESS OF BREATH) ON EXERTION: Status: ACTIVE | Noted: 2019-01-01

## 2020-01-01 ENCOUNTER — OFFICE VISIT (OUTPATIENT)
Dept: CARDIOLOGY CLINIC | Age: 70
End: 2020-01-01
Payer: MEDICARE

## 2020-01-01 ENCOUNTER — HOSPITAL ENCOUNTER (OUTPATIENT)
Dept: NON INVASIVE DIAGNOSTICS | Age: 70
Discharge: HOME | End: 2020-01-08
Payer: MEDICARE

## 2020-01-01 ENCOUNTER — HOSPITAL ENCOUNTER (OUTPATIENT)
Dept: SLEEP CENTER | Age: 70
Discharge: HOME | End: 2020-01-29
Payer: MEDICARE

## 2020-01-01 ENCOUNTER — OFFICE VISIT (OUTPATIENT)
Dept: FAMILY MEDICINE CLINIC | Age: 70
End: 2020-01-01
Payer: MEDICARE

## 2020-01-01 ENCOUNTER — OFFICE VISIT (OUTPATIENT)
Dept: PULMONOLOGY | Age: 70
End: 2020-01-01
Payer: MEDICARE

## 2020-01-01 ENCOUNTER — HOSPITAL ENCOUNTER (OUTPATIENT)
Dept: INPATIENT UNIT | Age: 70
End: 2020-02-06
Attending: INTERNAL MEDICINE | Admitting: INTERNAL MEDICINE
Payer: MEDICARE

## 2020-01-01 ENCOUNTER — TELEPHONE (OUTPATIENT)
Dept: PULMONOLOGY | Age: 70
End: 2020-01-01

## 2020-01-01 ENCOUNTER — APPOINTMENT (OUTPATIENT)
Dept: GENERAL RADIOLOGY | Age: 70
End: 2020-01-01
Attending: INTERNAL MEDICINE
Payer: MEDICARE

## 2020-01-01 ENCOUNTER — HOSPITAL ENCOUNTER (OUTPATIENT)
Dept: SLEEP CENTER | Age: 70
Discharge: HOME | End: 2020-01-16
Payer: MEDICARE

## 2020-01-01 ENCOUNTER — TELEPHONE (OUTPATIENT)
Dept: SLEEP CENTER | Age: 70
End: 2020-01-01

## 2020-01-01 ENCOUNTER — OFFICE VISIT (OUTPATIENT)
Dept: UROLOGY | Age: 70
End: 2020-01-01
Payer: MEDICARE

## 2020-01-01 ENCOUNTER — PROCEDURE VISIT (OUTPATIENT)
Dept: UROLOGY | Age: 70
End: 2020-01-01
Payer: MEDICARE

## 2020-01-01 ENCOUNTER — PREP FOR PROCEDURE (OUTPATIENT)
Dept: CARDIOLOGY | Age: 70
End: 2020-01-01

## 2020-01-01 VITALS — BODY MASS INDEX: 33.62 KG/M2 | HEIGHT: 66 IN | WEIGHT: 209.2 LBS

## 2020-01-01 VITALS
HEART RATE: 96 BPM | BODY MASS INDEX: 33.65 KG/M2 | HEIGHT: 66 IN | DIASTOLIC BLOOD PRESSURE: 75 MMHG | WEIGHT: 209.4 LBS | SYSTOLIC BLOOD PRESSURE: 136 MMHG

## 2020-01-01 VITALS
DIASTOLIC BLOOD PRESSURE: 74 MMHG | HEART RATE: 91 BPM | WEIGHT: 208 LBS | SYSTOLIC BLOOD PRESSURE: 114 MMHG | OXYGEN SATURATION: 92 % | BODY MASS INDEX: 33.43 KG/M2 | HEIGHT: 66 IN

## 2020-01-01 VITALS
HEART RATE: 88 BPM | WEIGHT: 210.6 LBS | BODY MASS INDEX: 33.99 KG/M2 | SYSTOLIC BLOOD PRESSURE: 106 MMHG | DIASTOLIC BLOOD PRESSURE: 54 MMHG | RESPIRATION RATE: 24 BRPM

## 2020-01-01 VITALS — HEIGHT: 66 IN | WEIGHT: 208 LBS | BODY MASS INDEX: 33.43 KG/M2

## 2020-01-01 VITALS
SYSTOLIC BLOOD PRESSURE: 130 MMHG | HEIGHT: 66 IN | DIASTOLIC BLOOD PRESSURE: 70 MMHG | WEIGHT: 211 LBS | BODY MASS INDEX: 33.91 KG/M2

## 2020-01-01 LAB
ABO: NORMAL
ACTIVATED CLOTTING TIME: 345 SECONDS (ref 1–150)
ACTIVATED CLOTTING TIME: 373 SECONDS (ref 1–150)
ACTIVATED CLOTTING TIME: 389 SECONDS (ref 1–150)
ACTIVATED CLOTTING TIME: 389 SECONDS (ref 1–150)
ACTIVATED CLOTTING TIME: 461 SECONDS (ref 1–150)
ANION GAP SERPL CALCULATED.3IONS-SCNC: 13 MEQ/L (ref 8–16)
ANION GAP SERPL CALCULATED.3IONS-SCNC: 23 MEQ/L (ref 8–16)
ANTIBODY SCREEN: NORMAL
APTT: 33.3 SECONDS (ref 22–38)
BACTERIA: ABNORMAL
BASE EXCESS MIXED: -20.8 MMOL/L (ref -2–3)
BILIRUBIN URINE: ABNORMAL
BILIRUBIN URINE: ABNORMAL
BILIRUBIN URINE: NEGATIVE
BLOOD URINE, POC: ABNORMAL
BLOOD URINE, POC: NEGATIVE
BLOOD, URINE: NEGATIVE
BUN BLDV-MCNC: 12 MG/DL (ref 7–22)
BUN BLDV-MCNC: 16 MG/DL (ref 7–22)
CALCIUM SERPL-MCNC: 7.9 MG/DL (ref 8.5–10.5)
CALCIUM SERPL-MCNC: 8.3 MG/DL (ref 8.5–10.5)
CASTS: ABNORMAL /LPF
CASTS: ABNORMAL /LPF
CHARACTER, URINE: CLEAR
CHLORIDE BLD-SCNC: 105 MEQ/L (ref 98–111)
CHLORIDE BLD-SCNC: 105 MEQ/L (ref 98–111)
CO2: 24 MEQ/L (ref 23–33)
CO2: 9 MEQ/L (ref 23–33)
COLLECTED BY:: ABNORMAL
COLOR, URINE: YELLOW
COLOR, URINE: YELLOW
COLOR: YELLOW
CREAT SERPL-MCNC: 0.8 MG/DL (ref 0.4–1.2)
CREAT SERPL-MCNC: 1.1 MG/DL (ref 0.4–1.2)
CRYSTALS: ABNORMAL
EPITHELIAL CELLS, UA: ABNORMAL /HPF
ERYTHROCYTE [DISTWIDTH] IN BLOOD BY AUTOMATED COUNT: 14 % (ref 11.5–14.5)
ERYTHROCYTE [DISTWIDTH] IN BLOOD BY AUTOMATED COUNT: 14.3 % (ref 11.5–14.5)
ERYTHROCYTE [DISTWIDTH] IN BLOOD BY AUTOMATED COUNT: 54.9 FL (ref 35–45)
ERYTHROCYTE [DISTWIDTH] IN BLOOD BY AUTOMATED COUNT: 60.9 FL (ref 35–45)
GFR SERPL CREATININE-BSD FRML MDRD: 66 ML/MIN/1.73M2
GFR SERPL CREATININE-BSD FRML MDRD: > 90 ML/MIN/1.73M2
GLUCOSE BLD-MCNC: 163 MG/DL (ref 70–108)
GLUCOSE BLD-MCNC: 289 MG/DL (ref 70–108)
GLUCOSE URINE: NEGATIVE MG/DL
GLUCOSE URINE: NEGATIVE MG/DL
GLUCOSE, URINE: NEGATIVE MG/DL
HCO3, MIXED: 15 MMOL/L (ref 23–28)
HCT VFR BLD CALC: 45.7 % (ref 42–52)
HCT VFR BLD CALC: 52.2 % (ref 42–52)
HEMOGLOBIN: 14.1 GM/DL (ref 14–18)
HEMOGLOBIN: 17.5 GM/DL (ref 14–18)
INR BLD: 1.03 (ref 0.85–1.13)
KETONES, URINE: ABNORMAL
KETONES, URINE: ABNORMAL
KETONES, URINE: NEGATIVE
LACTIC ACID: 15.2 MMOL/L (ref 0.5–2.2)
LEUKOCYTE CLUMPS, URINE: NEGATIVE
LEUKOCYTE CLUMPS, URINE: NEGATIVE
LEUKOCYTE ESTERASE, URINE: NEGATIVE
LV EF: 65 %
LVEF MODALITY: NORMAL
MAGNESIUM: 2.3 MG/DL (ref 1.6–2.4)
MCH RBC QN AUTO: 35.3 PG (ref 26–33)
MCH RBC QN AUTO: 35.3 PG (ref 26–33)
MCHC RBC AUTO-ENTMCNC: 30.9 GM/DL (ref 32.2–35.5)
MCHC RBC AUTO-ENTMCNC: 33.5 GM/DL (ref 32.2–35.5)
MCV RBC AUTO: 105.2 FL (ref 80–94)
MCV RBC AUTO: 114.3 FL (ref 80–94)
MISCELLANEOUS LAB TEST RESULT: ABNORMAL
NITRITE, URINE: NEGATIVE
O2 SAT, MIXED: 5 %
PCO2, MIXED VENOUS: 85 MMHG (ref 41–51)
PH UA: 5 (ref 5–9)
PH, MIXED: 6.85 (ref 7.31–7.41)
PH, URINE: 5.5 (ref 5–9)
PH, URINE: 6.5 (ref 5–9)
PHOSPHORUS: 5.7 MG/DL (ref 2.4–4.7)
PLATELET # BLD: 132 THOU/MM3 (ref 130–400)
PLATELET # BLD: 170 THOU/MM3 (ref 130–400)
PMV BLD AUTO: 10.5 FL (ref 9.4–12.4)
PMV BLD AUTO: 9.7 FL (ref 9.4–12.4)
PO2 MIXED: 11 MMHG (ref 25–40)
POST VOID RESIDUAL (PVR): 26 ML
POTASSIUM REFLEX MAGNESIUM: 4.2 MEQ/L (ref 3.5–5.2)
POTASSIUM SERPL-SCNC: 4.6 MEQ/L (ref 3.5–5.2)
PROTEIN UA: NEGATIVE MG/DL
PROTEIN, URINE: NEGATIVE MG/DL
PROTEIN, URINE: NEGATIVE MG/DL
RBC # BLD: 4 MILL/MM3 (ref 4.7–6.1)
RBC # BLD: 4.96 MILL/MM3 (ref 4.7–6.1)
RBC URINE: ABNORMAL /HPF
RENAL EPITHELIAL, UA: ABNORMAL
RH FACTOR: NORMAL
SCAN OF BLOOD SMEAR: NORMAL
SODIUM BLD-SCNC: 137 MEQ/L (ref 135–145)
SODIUM BLD-SCNC: 142 MEQ/L (ref 135–145)
SPECIFIC GRAVITY UA: 1.02 (ref 1–1.03)
SPECIFIC GRAVITY, URINE: 1.02 (ref 1–1.03)
SPECIFIC GRAVITY, URINE: 1.02 (ref 1–1.03)
STATUS: NORMAL
STATUS: NORMAL
TROPONIN T: 0.14 NG/ML
UROBILINOGEN, URINE: 0.2 EU/DL (ref 0–1)
UROBILINOGEN, URINE: 1 EU/DL (ref 0–1)
UROBILINOGEN, URINE: 2 EU/DL (ref 0–1)
WBC # BLD: 10.6 THOU/MM3 (ref 4.8–10.8)
WBC # BLD: 13.8 THOU/MM3 (ref 4.8–10.8)
WBC UA: ABNORMAL /HPF
YEAST: ABNORMAL

## 2020-01-01 PROCEDURE — 3017F COLORECTAL CA SCREEN DOC REV: CPT | Performed by: PHYSICIAN ASSISTANT

## 2020-01-01 PROCEDURE — 85610 PROTHROMBIN TIME: CPT

## 2020-01-01 PROCEDURE — 3017F COLORECTAL CA SCREEN DOC REV: CPT | Performed by: NURSE PRACTITIONER

## 2020-01-01 PROCEDURE — 92928 PRQ TCAT PLMT NTRAC ST 1 LES: CPT | Performed by: INTERNAL MEDICINE

## 2020-01-01 PROCEDURE — 3017F COLORECTAL CA SCREEN DOC REV: CPT | Performed by: INTERNAL MEDICINE

## 2020-01-01 PROCEDURE — G8427 DOCREV CUR MEDS BY ELIG CLIN: HCPCS | Performed by: PHYSICIAN ASSISTANT

## 2020-01-01 PROCEDURE — C1725 CATH, TRANSLUMIN NON-LASER: HCPCS

## 2020-01-01 PROCEDURE — 86850 RBC ANTIBODY SCREEN: CPT

## 2020-01-01 PROCEDURE — 2580000003 HC RX 258: Performed by: NURSE PRACTITIONER

## 2020-01-01 PROCEDURE — 86901 BLOOD TYPING SEROLOGIC RH(D): CPT

## 2020-01-01 PROCEDURE — 4040F PNEUMOC VAC/ADMIN/RCVD: CPT | Performed by: UROLOGY

## 2020-01-01 PROCEDURE — 2709999900 HC NON-CHARGEABLE SUPPLY

## 2020-01-01 PROCEDURE — 3023F SPIROM DOC REV: CPT | Performed by: FAMILY MEDICINE

## 2020-01-01 PROCEDURE — G8417 CALC BMI ABV UP PARAM F/U: HCPCS | Performed by: INTERNAL MEDICINE

## 2020-01-01 PROCEDURE — 99214 OFFICE O/P EST MOD 30 MIN: CPT | Performed by: PHYSICIAN ASSISTANT

## 2020-01-01 PROCEDURE — 1036F TOBACCO NON-USER: CPT | Performed by: FAMILY MEDICINE

## 2020-01-01 PROCEDURE — 4040F PNEUMOC VAC/ADMIN/RCVD: CPT | Performed by: FAMILY MEDICINE

## 2020-01-01 PROCEDURE — 92978 ENDOLUMINL IVUS OCT C 1ST: CPT | Performed by: INTERNAL MEDICINE

## 2020-01-01 PROCEDURE — 85347 COAGULATION TIME ACTIVATED: CPT

## 2020-01-01 PROCEDURE — 1036F TOBACCO NON-USER: CPT | Performed by: PHYSICIAN ASSISTANT

## 2020-01-01 PROCEDURE — C1751 CATH, INF, PER/CENT/MIDLINE: HCPCS

## 2020-01-01 PROCEDURE — 2700000000 HC OXYGEN THERAPY PER DAY

## 2020-01-01 PROCEDURE — 1123F ACP DISCUSS/DSCN MKR DOCD: CPT | Performed by: PHYSICIAN ASSISTANT

## 2020-01-01 PROCEDURE — 6360000002 HC RX W HCPCS: Performed by: FAMILY MEDICINE

## 2020-01-01 PROCEDURE — 81003 URINALYSIS AUTO W/O SCOPE: CPT | Performed by: NURSE PRACTITIONER

## 2020-01-01 PROCEDURE — 99214 OFFICE O/P EST MOD 30 MIN: CPT | Performed by: FAMILY MEDICINE

## 2020-01-01 PROCEDURE — 6370000000 HC RX 637 (ALT 250 FOR IP): Performed by: NURSE PRACTITIONER

## 2020-01-01 PROCEDURE — 93017 CV STRESS TEST TRACING ONLY: CPT

## 2020-01-01 PROCEDURE — 1036F TOBACCO NON-USER: CPT | Performed by: NURSE PRACTITIONER

## 2020-01-01 PROCEDURE — 78452 HT MUSCLE IMAGE SPECT MULT: CPT

## 2020-01-01 PROCEDURE — 99214 OFFICE O/P EST MOD 30 MIN: CPT | Performed by: INTERNAL MEDICINE

## 2020-01-01 PROCEDURE — 84484 ASSAY OF TROPONIN QUANT: CPT

## 2020-01-01 PROCEDURE — 51798 US URINE CAPACITY MEASURE: CPT | Performed by: NURSE PRACTITIONER

## 2020-01-01 PROCEDURE — G8417 CALC BMI ABV UP PARAM F/U: HCPCS | Performed by: FAMILY MEDICINE

## 2020-01-01 PROCEDURE — 94761 N-INVAS EAR/PLS OXIMETRY MLT: CPT

## 2020-01-01 PROCEDURE — 2720000010 HC SURG SUPPLY STERILE

## 2020-01-01 PROCEDURE — G8926 SPIRO NO PERF OR DOC: HCPCS | Performed by: FAMILY MEDICINE

## 2020-01-01 PROCEDURE — 6360000002 HC RX W HCPCS

## 2020-01-01 PROCEDURE — G8417 CALC BMI ABV UP PARAM F/U: HCPCS | Performed by: UROLOGY

## 2020-01-01 PROCEDURE — 3017F COLORECTAL CA SCREEN DOC REV: CPT | Performed by: FAMILY MEDICINE

## 2020-01-01 PROCEDURE — 2500000003 HC RX 250 WO HCPCS: Performed by: FAMILY MEDICINE

## 2020-01-01 PROCEDURE — 6360000004 HC RX CONTRAST MEDICATION

## 2020-01-01 PROCEDURE — 2500000003 HC RX 250 WO HCPCS

## 2020-01-01 PROCEDURE — G8427 DOCREV CUR MEDS BY ELIG CLIN: HCPCS | Performed by: UROLOGY

## 2020-01-01 PROCEDURE — 85730 THROMBOPLASTIN TIME PARTIAL: CPT

## 2020-01-01 PROCEDURE — G8417 CALC BMI ABV UP PARAM F/U: HCPCS | Performed by: PHYSICIAN ASSISTANT

## 2020-01-01 PROCEDURE — 93306 TTE W/DOPPLER COMPLETE: CPT

## 2020-01-01 PROCEDURE — 99222 1ST HOSP IP/OBS MODERATE 55: CPT | Performed by: INTERNAL MEDICINE

## 2020-01-01 PROCEDURE — 1036F TOBACCO NON-USER: CPT | Performed by: INTERNAL MEDICINE

## 2020-01-01 PROCEDURE — 99213 OFFICE O/P EST LOW 20 MIN: CPT | Performed by: UROLOGY

## 2020-01-01 PROCEDURE — 1123F ACP DISCUSS/DSCN MKR DOCD: CPT | Performed by: INTERNAL MEDICINE

## 2020-01-01 PROCEDURE — 95811 POLYSOM 6/>YRS CPAP 4/> PARM: CPT

## 2020-01-01 PROCEDURE — 1123F ACP DISCUSS/DSCN MKR DOCD: CPT | Performed by: UROLOGY

## 2020-01-01 PROCEDURE — G8417 CALC BMI ABV UP PARAM F/U: HCPCS | Performed by: NURSE PRACTITIONER

## 2020-01-01 PROCEDURE — C1753 CATH, INTRAVAS ULTRASOUND: HCPCS

## 2020-01-01 PROCEDURE — 1123F ACP DISCUSS/DSCN MKR DOCD: CPT | Performed by: NURSE PRACTITIONER

## 2020-01-01 PROCEDURE — 95810 POLYSOM 6/> YRS 4/> PARAM: CPT

## 2020-01-01 PROCEDURE — 85027 COMPLETE CBC AUTOMATED: CPT

## 2020-01-01 PROCEDURE — 52000 CYSTOURETHROSCOPY: CPT | Performed by: UROLOGY

## 2020-01-01 PROCEDURE — C1887 CATHETER, GUIDING: HCPCS

## 2020-01-01 PROCEDURE — 83605 ASSAY OF LACTIC ACID: CPT

## 2020-01-01 PROCEDURE — 4040F PNEUMOC VAC/ADMIN/RCVD: CPT | Performed by: INTERNAL MEDICINE

## 2020-01-01 PROCEDURE — 94770 HC ETCO2 MONITOR DAILY: CPT

## 2020-01-01 PROCEDURE — C1894 INTRO/SHEATH, NON-LASER: HCPCS

## 2020-01-01 PROCEDURE — C1769 GUIDE WIRE: HCPCS

## 2020-01-01 PROCEDURE — 6370000000 HC RX 637 (ALT 250 FOR IP)

## 2020-01-01 PROCEDURE — 93005 ELECTROCARDIOGRAM TRACING: CPT | Performed by: NURSE PRACTITIONER

## 2020-01-01 PROCEDURE — A9500 TC99M SESTAMIBI: HCPCS | Performed by: INTERNAL MEDICINE

## 2020-01-01 PROCEDURE — G8427 DOCREV CUR MEDS BY ELIG CLIN: HCPCS | Performed by: FAMILY MEDICINE

## 2020-01-01 PROCEDURE — C1729 CATH, DRAINAGE: HCPCS

## 2020-01-01 PROCEDURE — 93458 L HRT ARTERY/VENTRICLE ANGIO: CPT | Performed by: INTERNAL MEDICINE

## 2020-01-01 PROCEDURE — 81003 URINALYSIS AUTO W/O SCOPE: CPT | Performed by: UROLOGY

## 2020-01-01 PROCEDURE — C9600 PERC DRUG-EL COR STENT SING: HCPCS | Performed by: INTERNAL MEDICINE

## 2020-01-01 PROCEDURE — G8484 FLU IMMUNIZE NO ADMIN: HCPCS | Performed by: PHYSICIAN ASSISTANT

## 2020-01-01 PROCEDURE — 86900 BLOOD TYPING SEROLOGIC ABO: CPT

## 2020-01-01 PROCEDURE — 6360000004 HC RX CONTRAST MEDICATION: Performed by: INTERNAL MEDICINE

## 2020-01-01 PROCEDURE — G8484 FLU IMMUNIZE NO ADMIN: HCPCS | Performed by: INTERNAL MEDICINE

## 2020-01-01 PROCEDURE — 84100 ASSAY OF PHOSPHORUS: CPT

## 2020-01-01 PROCEDURE — 99213 OFFICE O/P EST LOW 20 MIN: CPT | Performed by: NURSE PRACTITIONER

## 2020-01-01 PROCEDURE — G8484 FLU IMMUNIZE NO ADMIN: HCPCS | Performed by: UROLOGY

## 2020-01-01 PROCEDURE — 92950 HEART/LUNG RESUSCITATION CPR: CPT

## 2020-01-01 PROCEDURE — 1036F TOBACCO NON-USER: CPT | Performed by: UROLOGY

## 2020-01-01 PROCEDURE — 3017F COLORECTAL CA SCREEN DOC REV: CPT | Performed by: UROLOGY

## 2020-01-01 PROCEDURE — 3430000000 HC RX DIAGNOSTIC RADIOPHARMACEUTICAL: Performed by: INTERNAL MEDICINE

## 2020-01-01 PROCEDURE — G8427 DOCREV CUR MEDS BY ELIG CLIN: HCPCS | Performed by: INTERNAL MEDICINE

## 2020-01-01 PROCEDURE — 93005 ELECTROCARDIOGRAM TRACING: CPT | Performed by: PHYSICIAN ASSISTANT

## 2020-01-01 PROCEDURE — 1123F ACP DISCUSS/DSCN MKR DOCD: CPT | Performed by: FAMILY MEDICINE

## 2020-01-01 PROCEDURE — 83735 ASSAY OF MAGNESIUM: CPT

## 2020-01-01 PROCEDURE — G8484 FLU IMMUNIZE NO ADMIN: HCPCS | Performed by: NURSE PRACTITIONER

## 2020-01-01 PROCEDURE — G8427 DOCREV CUR MEDS BY ELIG CLIN: HCPCS | Performed by: NURSE PRACTITIONER

## 2020-01-01 PROCEDURE — 31500 INSERT EMERGENCY AIRWAY: CPT | Performed by: NURSE PRACTITIONER

## 2020-01-01 PROCEDURE — 4040F PNEUMOC VAC/ADMIN/RCVD: CPT | Performed by: NURSE PRACTITIONER

## 2020-01-01 PROCEDURE — G8484 FLU IMMUNIZE NO ADMIN: HCPCS | Performed by: FAMILY MEDICINE

## 2020-01-01 PROCEDURE — 36415 COLL VENOUS BLD VENIPUNCTURE: CPT

## 2020-01-01 PROCEDURE — C1874 STENT, COATED/COV W/DEL SYS: HCPCS

## 2020-01-01 PROCEDURE — 4040F PNEUMOC VAC/ADMIN/RCVD: CPT | Performed by: PHYSICIAN ASSISTANT

## 2020-01-01 PROCEDURE — 80048 BASIC METABOLIC PNL TOTAL CA: CPT

## 2020-01-01 PROCEDURE — 2580000003 HC RX 258: Performed by: FAMILY MEDICINE

## 2020-01-01 RX ORDER — SODIUM CHLORIDE 0.9 % (FLUSH) 0.9 %
10 SYRINGE (ML) INJECTION EVERY 12 HOURS SCHEDULED
Status: CANCELLED | OUTPATIENT
Start: 2020-01-01

## 2020-01-01 RX ORDER — SODIUM CHLORIDE 9 MG/ML
INJECTION, SOLUTION INTRAVENOUS CONTINUOUS
Status: CANCELLED | OUTPATIENT
Start: 2020-01-01 | End: 2020-02-06

## 2020-01-01 RX ORDER — SODIUM CHLORIDE 9 MG/ML
INJECTION, SOLUTION INTRAVENOUS CONTINUOUS
Status: CANCELLED | OUTPATIENT
Start: 2020-01-01

## 2020-01-01 RX ORDER — SPIRONOLACTONE AND HYDROCHLOROTHIAZIDE 25; 25 MG/1; MG/1
1 TABLET ORAL DAILY
Qty: 30 TABLET | Refills: 3 | Status: CANCELLED | OUTPATIENT
Start: 2020-01-01

## 2020-01-01 RX ORDER — LORAZEPAM 2 MG/ML
INJECTION INTRAMUSCULAR
Status: DISCONTINUED
Start: 2020-01-01 | End: 2020-02-06 | Stop reason: HOSPADM

## 2020-01-01 RX ORDER — AMOXICILLIN 500 MG/1
1 CAPSULE ORAL 4 TIMES DAILY
COMMUNITY
Start: 2020-01-01

## 2020-01-01 RX ORDER — ACETAMINOPHEN 500 MG
1000 TABLET ORAL EVERY 4 HOURS PRN
COMMUNITY

## 2020-01-01 RX ORDER — SODIUM CHLORIDE 0.9 % (FLUSH) 0.9 %
10 SYRINGE (ML) INJECTION EVERY 12 HOURS SCHEDULED
Status: DISCONTINUED | OUTPATIENT
Start: 2020-01-01 | End: 2020-02-06 | Stop reason: HOSPADM

## 2020-01-01 RX ORDER — TAMSULOSIN HYDROCHLORIDE 0.4 MG/1
0.4 CAPSULE ORAL DAILY
Qty: 90 CAPSULE | Refills: 3 | Status: SHIPPED | OUTPATIENT
Start: 2020-01-01

## 2020-01-01 RX ORDER — SODIUM CHLORIDE 0.9 % (FLUSH) 0.9 %
10 SYRINGE (ML) INJECTION PRN
Status: CANCELLED | OUTPATIENT
Start: 2020-01-01

## 2020-01-01 RX ORDER — SODIUM CHLORIDE 0.9 % (FLUSH) 0.9 %
10 SYRINGE (ML) INJECTION PRN
Status: DISCONTINUED | OUTPATIENT
Start: 2020-01-01 | End: 2020-02-06 | Stop reason: HOSPADM

## 2020-01-01 RX ORDER — ACETAMINOPHEN 325 MG/1
650 TABLET ORAL EVERY 4 HOURS PRN
Status: CANCELLED | OUTPATIENT
Start: 2020-01-01

## 2020-01-01 RX ORDER — DIPHENHYDRAMINE HYDROCHLORIDE 50 MG/ML
50 INJECTION INTRAMUSCULAR; INTRAVENOUS ONCE
Status: CANCELLED | OUTPATIENT
Start: 2020-01-01 | End: 2020-01-01

## 2020-01-01 RX ORDER — LORAZEPAM 2 MG/ML
1 INJECTION INTRAMUSCULAR ONCE
Status: DISCONTINUED | OUTPATIENT
Start: 2020-01-01 | End: 2020-02-06 | Stop reason: HOSPADM

## 2020-01-01 RX ORDER — MORPHINE SULFATE 2 MG/ML
INJECTION, SOLUTION INTRAMUSCULAR; INTRAVENOUS
Status: DISCONTINUED
Start: 2020-01-01 | End: 2020-02-06 | Stop reason: HOSPADM

## 2020-01-01 RX ORDER — ALBUTEROL SULFATE 90 UG/1
2 AEROSOL, METERED RESPIRATORY (INHALATION) EVERY 6 HOURS PRN
Status: CANCELLED | OUTPATIENT
Start: 2020-01-01

## 2020-01-01 RX ORDER — DOCUSATE SODIUM 100 MG/1
100 CAPSULE, LIQUID FILLED ORAL DAILY
Status: CANCELLED | OUTPATIENT
Start: 2020-01-01

## 2020-01-01 RX ORDER — ASPIRIN 325 MG
325 TABLET ORAL ONCE
Status: COMPLETED | OUTPATIENT
Start: 2020-01-01 | End: 2020-01-01

## 2020-01-01 RX ORDER — HYDROCODONE BITARTRATE AND ACETAMINOPHEN 5; 325 MG/1; MG/1
1 TABLET ORAL EVERY 4 HOURS PRN
Status: CANCELLED | OUTPATIENT
Start: 2020-01-01

## 2020-01-01 RX ORDER — TAMSULOSIN HYDROCHLORIDE 0.4 MG/1
0.4 CAPSULE ORAL DAILY
Status: CANCELLED | OUTPATIENT
Start: 2020-01-01

## 2020-01-01 RX ORDER — AMOXICILLIN 500 MG/1
500 CAPSULE ORAL 4 TIMES DAILY
Status: CANCELLED | OUTPATIENT
Start: 2020-01-01

## 2020-01-01 RX ORDER — ASPIRIN 325 MG
325 TABLET ORAL ONCE
Status: CANCELLED | OUTPATIENT
Start: 2020-01-01 | End: 2020-01-01

## 2020-01-01 RX ORDER — OLANZAPINE AND FLUOXETINE 12; 50 MG/1; MG/1
1 CAPSULE ORAL NIGHTLY PRN
Status: DISCONTINUED | OUTPATIENT
Start: 2020-01-01 | End: 2020-02-06 | Stop reason: HOSPADM

## 2020-01-01 RX ORDER — ASPIRIN 81 MG/1
81 TABLET ORAL DAILY
Status: CANCELLED | OUTPATIENT
Start: 2020-02-06

## 2020-01-01 RX ORDER — SODIUM CHLORIDE 9 MG/ML
INJECTION, SOLUTION INTRAVENOUS CONTINUOUS
Status: DISCONTINUED | OUTPATIENT
Start: 2020-01-01 | End: 2020-02-06 | Stop reason: HOSPADM

## 2020-01-01 RX ORDER — ATORVASTATIN CALCIUM 80 MG/1
80 TABLET, FILM COATED ORAL NIGHTLY
Status: CANCELLED | OUTPATIENT
Start: 2020-01-01

## 2020-01-01 RX ORDER — ETODOLAC 500 MG/1
500 TABLET, FILM COATED ORAL 2 TIMES DAILY
Qty: 180 TABLET | Refills: 3 | Status: ON HOLD | OUTPATIENT
Start: 2020-01-01 | End: 2020-01-01

## 2020-01-01 RX ORDER — NITROGLYCERIN 0.4 MG/1
0.4 TABLET SUBLINGUAL EVERY 5 MIN PRN
Status: DISCONTINUED | OUTPATIENT
Start: 2020-01-01 | End: 2020-02-06 | Stop reason: HOSPADM

## 2020-01-01 RX ORDER — NITROGLYCERIN 0.4 MG/1
0.4 TABLET SUBLINGUAL EVERY 5 MIN PRN
Status: CANCELLED | OUTPATIENT
Start: 2020-01-01

## 2020-01-01 RX ADMIN — SODIUM CHLORIDE: 9 INJECTION, SOLUTION INTRAVENOUS at 11:17

## 2020-01-01 RX ADMIN — Medication 31 MILLICURIE: at 11:31

## 2020-01-01 RX ADMIN — ASPIRIN 325 MG: 325 TABLET, FILM COATED ORAL at 11:26

## 2020-01-01 RX ADMIN — SODIUM CHLORIDE 1000 ML: 9 INJECTION, SOLUTION INTRAVENOUS at 20:28

## 2020-01-01 RX ADMIN — EPINEPHRINE 1 MG: 0.1 INJECTION, SOLUTION ENDOTRACHEAL; INTRACARDIAC; INTRAVENOUS at 21:03

## 2020-01-01 RX ADMIN — Medication 50 MEQ: at 21:09

## 2020-01-01 RX ADMIN — EPINEPHRINE 1 MG: 0.1 INJECTION, SOLUTION ENDOTRACHEAL; INTRACARDIAC; INTRAVENOUS at 20:57

## 2020-01-01 RX ADMIN — EPINEPHRINE 1 MG: 0.1 INJECTION, SOLUTION ENDOTRACHEAL; INTRACARDIAC; INTRAVENOUS at 21:06

## 2020-01-01 RX ADMIN — EPINEPHRINE 1 MG: 0.1 INJECTION, SOLUTION ENDOTRACHEAL; INTRACARDIAC; INTRAVENOUS at 21:00

## 2020-01-01 RX ADMIN — EPINEPHRINE 1 MG: 0.1 INJECTION, SOLUTION ENDOTRACHEAL; INTRACARDIAC; INTRAVENOUS at 21:36

## 2020-01-01 RX ADMIN — Medication 50 MEQ: at 21:40

## 2020-01-01 RX ADMIN — LORAZEPAM 1 MG: 2 INJECTION, SOLUTION INTRAMUSCULAR; INTRAVENOUS at 20:36

## 2020-01-01 RX ADMIN — EPINEPHRINE 1 MG: 0.1 INJECTION, SOLUTION ENDOTRACHEAL; INTRACARDIAC; INTRAVENOUS at 20:53

## 2020-01-01 RX ADMIN — Medication 9.6 MILLICURIE: at 10:25

## 2020-01-01 RX ADMIN — EPINEPHRINE 1 MG: 0.1 INJECTION, SOLUTION ENDOTRACHEAL; INTRACARDIAC; INTRAVENOUS at 21:12

## 2020-01-01 RX ADMIN — EPINEPHRINE 1 MG: 0.1 INJECTION, SOLUTION ENDOTRACHEAL; INTRACARDIAC; INTRAVENOUS at 21:28

## 2020-01-01 RX ADMIN — Medication 50 MEQ: at 21:38

## 2020-01-01 RX ADMIN — LORAZEPAM 1 MG: 2 INJECTION, SOLUTION INTRAMUSCULAR; INTRAVENOUS at 21:36

## 2020-01-01 RX ADMIN — IOPAMIDOL 475 ML: 755 INJECTION, SOLUTION INTRAVENOUS at 18:01

## 2020-01-01 RX ADMIN — EPINEPHRINE 1 MG: 0.1 INJECTION, SOLUTION ENDOTRACHEAL; INTRACARDIAC; INTRAVENOUS at 21:39

## 2020-01-01 RX ADMIN — EPINEPHRINE 1 MG: 0.1 INJECTION, SOLUTION ENDOTRACHEAL; INTRACARDIAC; INTRAVENOUS at 21:09

## 2020-01-01 RX ADMIN — MORPHINE SULFATE 1 MG: 2 INJECTION, SOLUTION INTRAMUSCULAR; INTRAVENOUS at 20:39

## 2020-01-01 RX ADMIN — EPINEPHRINE 1 MG: 0.1 INJECTION, SOLUTION ENDOTRACHEAL; INTRACARDIAC; INTRAVENOUS at 21:30

## 2020-01-01 RX ADMIN — EPINEPHRINE 1 MG: 0.1 INJECTION, SOLUTION ENDOTRACHEAL; INTRACARDIAC; INTRAVENOUS at 21:26

## 2020-01-01 RX ADMIN — EPINEPHRINE 1 MG: 0.1 INJECTION, SOLUTION ENDOTRACHEAL; INTRACARDIAC; INTRAVENOUS at 21:33

## 2020-01-01 RX ADMIN — EPINEPHRINE 1 MG: 0.1 INJECTION, SOLUTION ENDOTRACHEAL; INTRACARDIAC; INTRAVENOUS at 21:19

## 2020-01-01 RX ADMIN — EPINEPHRINE 1 MG: 0.1 INJECTION, SOLUTION ENDOTRACHEAL; INTRACARDIAC; INTRAVENOUS at 21:16

## 2020-01-01 RX ADMIN — EPINEPHRINE 1 MG: 0.1 INJECTION, SOLUTION ENDOTRACHEAL; INTRACARDIAC; INTRAVENOUS at 21:22

## 2020-01-01 RX ADMIN — Medication 50 MEQ: at 21:24

## 2020-01-01 ASSESSMENT — ENCOUNTER SYMPTOMS
SHORTNESS OF BREATH: 1
COUGH: 0
STRIDOR: 0
ABDOMINAL PAIN: 0
RESPIRATORY NEGATIVE: 1
ALLERGIC/IMMUNOLOGIC NEGATIVE: 1
NAUSEA: 0
COUGH: 1
BACK PAIN: 0
DIARRHEA: 0
NAUSEA: 0
CHEST TIGHTNESS: 0
GASTROINTESTINAL NEGATIVE: 1
BACK PAIN: 0
EYES NEGATIVE: 1
WHEEZING: 0
EYES NEGATIVE: 1
CHEST TIGHTNESS: 0
SHORTNESS OF BREATH: 1
VOMITING: 0

## 2020-01-01 ASSESSMENT — PAIN SCALES - GENERAL
PAINLEVEL_OUTOF10: 0
PAINLEVEL_OUTOF10: 0

## 2020-01-01 ASSESSMENT — PAIN DESCRIPTION - LOCATION: LOCATION: ABDOMEN

## 2020-01-02 NOTE — PROGRESS NOTES
[address] [place] [address],[address]  [address] [place] [address]  Dept: [phone]  Loc: [phone]    Visit Date: 1/2/2020        HPI:     Luana Virk is a 68 y.o. male who presents today for:  Chief Complaint   Patient presents with    Follow-up     OAB, BPH, PVR        HPI   Pt is seen in follow up for urinary incontinence. Pt is currently on Myrbetriq 50 mg daily and Flomax 0.4 mg. Pt reports his urinary complaints are improved but he does still have incontinence. He is now down to nocturia of 1 x per night and is at times able to go 90 minutes between voids. He is using 2-3 pads per day. Previously pt had nocturia of 3 x per night, frequency of every 15 minutes, and use of 11 depends per day. No gross hematuria. Continues to smoke 2 ppd of cigarettes. PSA 7/8/19 2.09. Father passed away at age 61 from prostate cancer.     Current Outpatient Medications   Medication Sig Dispense Refill    tamsulosin (FLOMAX) 0.4 MG capsule Take 1 capsule by mouth daily 90 capsule 3    mirabegron (MYRBETRIQ) 50 MG TB24 Take 50 mg by mouth daily 90 tablet 3    spironolactone-hydrochlorothiazide (ALDACTAZIDE) 25-25 MG per tablet Take 1 tablet by mouth daily 30 tablet 3    tiotropium (SPIRIVA HANDIHALER) 18 MCG inhalation capsule Inhale 1 capsule into the lungs daily 30 capsule 3    amLODIPine-benazepril (LOTREL) 5-10 MG per capsule TAKE 1 CAPSULE DAILY 90 capsule 4    docusate sodium (COLACE) 100 MG capsule Take 100 mg by mouth daily      OLANZapine-FLUoxetine HCl 12-50 MG CAPS TAKE 1 CAPSULE NIGHTLY 90 capsule 3    aspirin 325 MG EC tablet Take 1 tablet by mouth daily 90 tablet 3    atorvastatin (LIPITOR) 10 MG tablet Take 1 tablet by mouth nightly 90 tablet 3    albuterol sulfate HFA (VENTOLIN HFA) 108 (90 Base) MCG/ACT inhaler Inhale 2 puffs into the lungs every 6 hours as needed for Wheezing or Shortness of Breath 1 Inhaler 2    etodolac Normocephalic and atraumatic. Eyes:      General: No scleral icterus. Right eye: No discharge. Left eye: No discharge. Neck:      Vascular: No JVD. Trachea: No tracheal deviation. Cardiovascular:      Rate and Rhythm: Normal rate and regular rhythm. Heart sounds: Normal heart sounds. Pulmonary:      Effort: Pulmonary effort is normal. No respiratory distress. Breath sounds: Normal breath sounds. Abdominal:      General: Bowel sounds are normal. There is no distension. Palpations: Abdomen is soft. Musculoskeletal: Normal range of motion. Skin:     General: Skin is warm and dry. Capillary Refill: Capillary refill takes less than 2 seconds. Neurological:      Mental Status: He is alert and oriented to person, place, and time. Cranial Nerves: No cranial nerve deficit. Psychiatric:         Behavior: Behavior normal.         POC  Results for POC orders placed in visit on 01/02/20   POCT Urinalysis No Micro (Auto)   Result Value Ref Range    Glucose, Ur Negative NEGATIVE mg/dl    Bilirubin Urine Large (A)     Ketones, Urine Trace (A) NEGATIVE    Specific Gravity, Urine 1.025 1.002 - 1.03    Blood, UA POC Negative NEGATIVE    pH, Urine 5.50 5.0 - 9.0    Protein, Urine Negative NEGATIVE mg/dl    Urobilinogen, Urine 2.00 0.0 - 1.0 eu/dl    Nitrite, Urine Negative NEGATIVE    Leukocyte Clumps, Urine Negative NEGATIVE    Color, Urine Yellow YELLOW-STR    Character, Urine Clear [id number]   poct post void residual   Result Value Ref Range    post void residual 26 ml     Cytology 8/14/19  FINAL RESULTS:  Negative for high-grade urothelial carcinoma.     Assessment:   Urinary incontinence  Frequency, urgency  Chronic tobacco use  Enlarged prostate  Family hx of prostate cancer    Plan:     Pt's urinary symptoms markedly improved on current therapy of Myrbetriq 50 mg daily and flomax 0.4 mg daily. Pt reports he would like further improvement.   ? If secondary to OAB vs BPH.  Will schedule appt with one of the physicians for possible cystoscopy to evaluate for MCKEON.

## 2020-01-09 PROBLEM — I73.9 PAD (PERIPHERAL ARTERY DISEASE) (HCC): Status: ACTIVE | Noted: 2020-01-01

## 2020-01-09 NOTE — PROGRESS NOTES
Visit Information    Have you changed or started any medications since your last visit including any over-the-counter medicines, vitamins, or herbal medicines? yes - see updated med list   Are you having any side effects from any of your medications? -  no  Have you stopped taking any of your medications? Is so, why? -  no    Have you seen any other physician or provider since your last visit? Yes - Records Obtained  Have you had any other diagnostic tests since your last visit? Yes - Records Obtained  Have you been seen in the emergency room and/or had an admission to a hospital since we last saw you? No  Have you had your routine dental cleaning in the past 6 months? n/a    Have you activated your [employer] account? If not, what are your barriers?  No: declined     Patient Care Team:  Josee Jaquez DO as PCP - General (Family Medicine)  Josee Jaquez DO as PCP - [facility]  Gudelia Metzger as Physician (Psychiatry)  Jose Samule MD as Consulting Physician (Orthopedic Surgery)  Angeles Sánchez MD as Consulting Physician (Orthopedic Surgery)    Medical History Review  Past Medical, Family, and Social History reviewed and does contribute to the patient presenting condition    Health Maintenance   Topic Date Due    Hepatitis C screen  1950    Shingles Vaccine (2 of 3) 10/09/2015    Flu vaccine (1) 09/01/2019    A1C test (Diabetic or Prediabetic)  07/08/2020    Lipid screen  07/08/2020    Annual Wellness Visit (AWV)  08/13/2020    Potassium monitoring  12/27/2020    Creatinine monitoring  12/27/2020    Colon cancer screen colonoscopy  09/03/2024    DTaP/Tdap/Td vaccine (2 - Td) 08/14/2025    Pneumococcal 65+ years Vaccine  Completed    AAA screen  Completed

## 2020-01-09 NOTE — PROGRESS NOTES
CREATININE 0.8 12/27/2019 1140        Component Value Date/Time    CALCIUM 9.0 12/27/2019 1140    ALKPHOS 118 07/08/2019 0913    AST 56 (H) 07/08/2019 0913    ALT 86 (H) 07/08/2019 0913    BILITOT 0.6 07/08/2019 0913            Lab Results   Component Value Date    TSH 2.280 07/08/2019       Lab Results   Component Value Date    WBC 8.1 07/08/2019    HGB 17.7 07/08/2019    HCT 51.5 07/08/2019    .2 (H) 07/08/2019     07/08/2019         Health Maintenance   Topic Date Due    Hepatitis C screen  1950    Shingles Vaccine (2 of 3) 10/09/2015    Flu vaccine (1) 09/01/2019    Low dose CT lung screening  02/05/2020    A1C test (Diabetic or Prediabetic)  07/08/2020    Lipid screen  07/08/2020    Annual Wellness Visit (AWV)  08/13/2020    Potassium monitoring  12/27/2020    Creatinine monitoring  12/27/2020    Colon cancer screen colonoscopy  09/03/2024    DTaP/Tdap/Td vaccine (2 - Td) 08/14/2025    Pneumococcal 65+ years Vaccine  Completed    AAA screen  Completed       Immunization History   Administered Date(s) Administered    Influenza Vaccine, unspecified formulation 11/05/2013, 10/15/2014, 09/14/2015    Influenza, High Dose (Fluzone 65 yrs and older) 10/09/2017    Influenza, Quadv, IM, (6 mo and older Fluzone, Flulaval, Fluarix and 3 yrs and older Afluria) 10/05/2016    Pneumococcal Conjugate 13-valent ([id number]) 07/15/2015    Pneumococcal Polysaccharide ([id number]) 02/24/2014    Tdap (Boostrix, Adacel) 08/14/2015    Zoster Live (Zostavax) 08/14/2015         Review of Systems   Constitutional: Negative. HENT: Negative. Respiratory: Positive for cough and shortness of breath. Negative for chest tightness. Cardiovascular: Negative. Negative for chest pain and palpitations. Gastrointestinal: Negative. Musculoskeletal: Negative. All other systems reviewed and are negative. Objective:   Physical Exam  Vitals signs and nursing note reviewed.    Constitutional: Appearance: He is well-developed. HENT:      Head: Normocephalic and atraumatic. Right Ear: Tympanic membrane normal.      Left Ear: Tympanic membrane normal.   Neck:      Musculoskeletal: Neck supple. Vascular: No carotid bruit. Cardiovascular:      Rate and Rhythm: Normal rate and regular rhythm. Heart sounds: Normal heart sounds. No murmur. Pulmonary:      Effort: Pulmonary effort is normal.      Breath sounds: Decreased breath sounds present. No wheezing or rhonchi. Abdominal:      General: Bowel sounds are normal.      Palpations: Abdomen is soft. Skin:     General: Skin is warm and dry. Neurological:      Mental Status: He is alert and oriented to person, place, and time. Psychiatric:         Behavior: Behavior normal.         Assessment:       Diagnosis Orders   1. HERNANDEZ (dyspnea on exertion)     2. Chronic obstructive pulmonary disease, unspecified COPD type ([address]     3. Abnormal stress test     4. Essential hypertension     5. Tobacco abuse     6. Urinary frequency     7. Microscopic hematuria     8. IFG (impaired fasting glucose)     9. Spinal stenosis of lumbar region with neurogenic claudication     10. Acute diastolic congestive heart failure ([address]     11. Bipolar I disorder, moderate, current or most recent episode depressed, in full remission ([address]     12.  PAD (peripheral artery disease) ([facility])             Plan:      -  Chronic medical problems stable  -  Continue current medications  -  Follow up with specialists as scheduled, recently had abnormal stress test.  appt with Dr. Ron Singer on the 15th  -  RTO 6 mos          Shalonda De La Torre DO

## 2020-01-15 PROBLEM — I20.8 ANGINAL EQUIVALENT (HCC): Status: ACTIVE | Noted: 2020-01-01

## 2020-01-15 PROBLEM — R93.1 ABNORMAL NUCLEAR CARDIAC IMAGING TEST: Status: ACTIVE | Noted: 2020-01-01

## 2020-01-15 PROBLEM — I50.32 CHRONIC DIASTOLIC CONGESTIVE HEART FAILURE (HCC): Status: ACTIVE | Noted: 2020-01-01

## 2020-01-15 NOTE — PROGRESS NOTES
Chief Complaint   Patient presents with    Follow-up    Congestive Heart Failure   originallt  Patient for sob on exertion      Sob on exertion and at rest     Lost 3 lb after aldactazide    Leg edema now Trace from +2  better    Dxed with copd GOld I and pFT mild obstructive    Denied cp, dizziness or palpitation    Smokes 2ppd for 40 yrs now cut down to 1 ppd  Quit abdirahmaninh 2020    [id number]  Brother had MI at 62 yrs  Gn=randfather had MI and  61    Patient states no echo has been done that he remembers.            Patient Active Problem List   Diagnosis    HTN (hypertension)    Hyperlipidemia    Paranoia ([address]    COPD (chronic obstructive pulmonary disease) ([address]    IFG (impaired fasting glucose)    Bipolar I disorder, moderate, current or most recent episode depressed, in full remission ([address]    Tobacco abuse    SOB (shortness of breath) on exertion    Family history of premature CAD    Bilateral leg edema trace now from +2    Acute diastolic congestive heart failure ([address]    PAD (peripheral artery disease) ([address]    Chronic diastolic congestive heart failure (HCC)    Abnormal nuclear cardiac imaging test- Inferior ischemia    Anginal equivalent [facility])       Past Surgical History:   Procedure Laterality Date    BACK SURGERY  2019    FOOT SURGERY Right 2009    LEG SURGERY Right @ 2-3 yrs old    TOTAL HIP ARTHROPLASTY Right 2019    VASECTOMY  @ 27 yrs old       No Known Allergies     Family History   Problem Relation Age of Onset    Cancer Father         Prostate    Mental Illness Mother         \"nervous condition\"    Cancer Sister         throat    Heart Disease Brother         MI x2    Pancreatic Cancer Brother     Liver Cancer Brother     Cancer Maternal Grandfather     Heart Disease Maternal Grandfather         Social History     Socioeconomic History    Marital status:      Spouse name: Not on file    Number of children: 1    Years of education: Not on HFA) 108 (90 Base) MCG/ACT inhaler Inhale 2 puffs into the lungs every 6 hours as needed for Wheezing or Shortness of Breath 1 Inhaler 2     No current facility-administered medications for this visit. Review of Systems -     General ROS: negative  Psychological ROS: negative  Hematological and Lymphatic ROS: No history of blood clots or bleeding disorder. Respiratory ROS: no cough,  or wheezing, the rest see HPI  Cardiovascular ROS: See HPI  Gastrointestinal ROS: negative  Genito-Urinary ROS: no dysuria, trouble voiding, or hematuria  Musculoskeletal ROS: negative  Neurological ROS: no TIA or stroke symptoms  Dermatological ROS: negative      Blood pressure 136/75, pulse 96, height 5' 6\" (1.676 m), weight 209 lb 6.4 oz (95 kg). Physical Examination:    General appearance - alert, well appearing, and in no distress  HEENT- Pink conjunctiva  , Non-icteri sclera,PERRLA  Mental status - alert, oriented to person, place, and time  Neck - supple, no significant adenopathy, no JVD, or carotid bruits  Chest - clear to auscultation, no wheezes, rales or rhonchi, symmetric air entry  Heart - normal rate, regular rhythm, normal S1, S2, no murmurs, rubs, clicks or gallops  Abdomen - soft, nontender, nondistended, no masses or organomegaly  KEV- no CVA or flank tenderness, no suprapubic tenderness  Neurological - alert, oriented, normal speech, no focal findings or movement disorder noted  Musculoskeletal/limbs - no joint tenderness, deformity or swelling   - peripheral pulses normal, no pedal edema, no clubbing or cyanosis  Skin - normal coloration and turgor, no rashes, no suspicious skin lesions noted  Psych- appropriate mood and affect    Lab  No results for input(s): CKTOTAL, CKMB, CKMBINDEX, TROPONINI in the last 72 hours.   CBC:   Lab Results   Component Value Date    WBC 8.1 07/08/2019    RBC 4.99 07/08/2019    HGB 17.7 07/08/2019    HCT 51.5 07/08/2019    .2 07/08/2019    MCH 35.5 07/08/2019 MCHC 34.4 07/08/2019     07/08/2019    MPV 9.3 07/08/2019     BMP:    Lab Results   Component Value Date     12/27/2019    K 4.4 12/27/2019     12/27/2019    CO2 27 12/27/2019    BUN 17 12/27/2019    LABALBU 3.6 07/08/2019    CREATININE 0.8 12/27/2019    CALCIUM 9.0 12/27/2019    LABGLOM >90 12/27/2019    GLUCOSE 135 12/27/2019    GLUCOSE 105 07/16/2015     Hepatic Function Panel:    Lab Results   Component Value Date    ALKPHOS 118 07/08/2019    ALT 86 07/08/2019    AST 56 07/08/2019    PROT 7.6 07/08/2019    BILITOT 0.6 07/08/2019    LABALBU 3.6 07/08/2019     Magnesium:    Lab Results   Component Value Date    MG 2.1 12/27/2019     Warfarin PT/INR:  No components found for: PTPATWAR, PTINRWAR  HgBA1c:    Lab Results   Component Value Date    LABA1C 5.8 07/08/2019     FLP:    Lab Results   Component Value Date    TRIG 150 07/08/2019    HDL 35 07/08/2019    HDL 30 03/21/2012    LDLCALC 127 07/08/2019    LABVLDL 27 07/10/2018     TSH:    Lab Results   Component Value Date    TSH 2.280 07/08/2019     ekg  12/23/19  Sinus  Rhythm   WITHIN NORMAL LIMITS      Conclusions    Summary   Lexiscan EKG stress test is not suggestive for ischemia. The nuclear images is suggestive for mild myocardial ischemia in the   inferior wall. Recommendation   Clinical correlation is recommended due to poor image quality. Signatures      ----------------------------------------------------------------   Electronically signed by Wilfredo Khan MD (Interpreting   Cardiologist) on 01/08/2020 at 18:05      Assessment   Diagnosis Orders   1. Abnormal nuclear cardiac imaging test- Inferior ischemia     2. Anginal equivalent ([address]     3. SOB (shortness of breath) on exertion     4. Chronic diastolic congestive heart failure ([address]     5. Bilateral leg edema trace now from +2     6. Essential hypertension     7.  Pure hypercholesterolemia           Plan   Continue the current treatment and with constant vigilance to changes in symptoms and also any potential side effects. Return for care or seek medical attention immediately if symptoms got worse and/or develop new symptoms. Hypertension, on medical treatment. Seems to be under good control. Patient is compliant with medical treatment. Hyperlipidemia: on statins, followed periodically. Patient need periodic lipid and liver profile. Sob on exertion      Congestive heart failure: no evidence of fluid overload today, no recent hospitalization for CHF  Losing wt  Leg edema Better trace  Still sob and worse on exertion  Cont  aldactazide 25/25 1 tab po qd  Echo EF WNL    Sob on exertion/ angina equivalent  Hx of copd  FHX of CAD  Functional study- mild inferior ischemia  Need cardiac cath  Start lopressor 12.5  Po bid    The risk and benefit of left heart cath has been explain in detail including but not limited to  Bleeding including retroperitoneal bleed 1%, infection, MI, CVA, JAEL, Limb loss, dissection, allergic reaction,death  Each of them 1 in 2000 range. The alternative managment has been explained. Patient expressed understanding of the risk and benefit and of the alternative managment well. Patient wanted and agreed to proceed with left heart cath. Hence we will schedule him for [address] [place] [address]    .        D/w the pat the plan of care         I spent 25 minutes involved in face-to-face discussion of medical issues, prognosis, record review  and plan with the patient today and more than 50% of the time was spent on counseling and coordination of care      Obi Lam MD      [address]

## 2020-01-16 NOTE — PROGRESS NOTES
[address] [place] [address]                               SLEEP STUDY REPORT    PATIENT NAME: Tone Mcleod                    :        1950  MED REC NO:   [id number]                           ROOM:  ACCOUNT NO:   [de-identified]                           ADMIT DATE: 2020  PROVIDER:     Hugo Amaya. MD Deniz    DATE OF STUDY:  2020    REFERRING PROVIDER:  Sailaja Mccray DO    The patient's height is 66 inches. Weight is 213 pounds with a BMI of  34.4. HISTORY:  The patient is a 47-year-old gentleman, was initially  evaluated by me on 2019. The patient currently suffering with  hypersomnia with [place] Sleepiness Score of 15. The patient is  scheduled for sleep study to evaluate for sleep apnea as an etiology for  hypersomnia. The patient's neck circumference was 17-1/4 inches and  class IV Mallampati airway. METHODS:  The patient underwent digital polysomnography in compliance  with the standards and specifications from the AASM Manual including the  simultaneous recording of 3 EEG channels (F4-M1, C4-M1, and O2-M1 with  back up electrodes F3-M2, C3-M2, and O1-M2), 2 EOG channels (E1-M2, and  E2-M1,), EMG (chin, left & right leg), EKG, Nonin pulse oximetry with  less than 2 second averaging time, body position, airflow recorded by  oral-nasal thermal sensor and nasal air pressure transducer, plus  respiratory effort recorded by calibrated respiratory inductance  plethysmography (RIP), flow volume loop, sound and video. Sleep staging  and scoring followed the standard put forth by the American Academy of  Sleep Medicine and utilized the 4A obstructive hypopnea event  desaturation of 4 percent or greater. INTERPRETATION:  This is a baseline sleep study and the study was  performed on 2020.   The study was started at 09:32 p.m. and was  terminated at 05:02 a.m. with a total recording time of 449.9

## 2020-01-16 NOTE — PROGRESS NOTES
bladder. The bladder was thoroughly inspected and the following was noted:    Residual Urine: Minimal   Urethra: meatal stenosis. Gently dilated with scope and bypassed with minimal difficulty. Prostate: lateral lobe hypertrophy  Bladder: No tumors or CIS noted. No bladder diverticulum. Mild trabeculation noted. Ureters: Clear efflux from both ureters. Orifices with normal configuration and location. The cystoscope was removed. The patient tolerated the procedure well. Last several PSA's:  Lab Results   Component Value Date    PSA 2.09 (H) 07/08/2019       Last total testosterone:  No results found for: TESTOSTERONE    Urinalysis today:  Results for POC orders placed in visit on 01/16/20   POCT Urinalysis No Micro (Auto)   Result Value Ref Range    Glucose, Ur Negative NEGATIVE mg/dl    Bilirubin Urine Moderate (A)     Ketones, Urine Negative NEGATIVE    Specific Gravity, Urine 1.020 1.002 - 1.03    Blood, UA POC Trace-intact NEGATIVE    pH, Urine 6.50 5.0 - 9.0    Protein, Urine Negative NEGATIVE mg/dl    Urobilinogen, Urine 0.20 0.0 - 1.0 eu/dl    Nitrite, Urine Negative NEGATIVE    Leukocyte Clumps, Urine Negative NEGATIVE    Color, Urine Yellow YELLOW-STR    Character, Urine Clear [id number]       Last BUN and creatinine:  Lab Results   Component Value Date    BUN 17 12/27/2019     Lab Results   Component Value Date    CREATININE 0.8 12/27/2019       Imaging Reviewed during this Office Visit:   Triny Dumas MD independently reviewed the images and verified the radiology reports from:    No results found.     PAST MEDICAL, FAMILY AND SOCIAL HISTORY:  Past Medical History:   Diagnosis Date    Arthritis     Emphysema     Hayfever     Hyperlipidemia     Hypertension     Snores      Past Surgical History:   Procedure Laterality Date    BACK SURGERY  02/2019    FOOT SURGERY Right 11/23/2009    LEG SURGERY Right @ 2-3 yrs old    TOTAL HIP ARTHROPLASTY Right 05/2019    VASECTOMY  @ 27 yrs old Family History   Problem Relation Age of Onset    Cancer Father         Prostate    Mental Illness Mother         \"nervous condition\"    Cancer Sister         throat    Heart Disease Brother         MI x2    Pancreatic Cancer Brother     Liver Cancer Brother     Cancer Maternal Grandfather     Heart Disease Maternal Grandfather      Outpatient Medications Marked as Taking for the 1/16/20 encounter (Procedure visit) with Kath Jimenez MD   Medication Sig Dispense Refill    acetaminophen (TYLENOL) 500 MG tablet Take 1,000 mg by mouth 3 times daily      Cholecalciferol (VITAMIN D3) 125 MCG (5000 UT) TABS Take by mouth      metoprolol tartrate (LOPRESSOR) 25 MG tablet Take 0.5 tablets by mouth 2 times daily 30 tablet 5    etodolac (LODINE) 500 MG tablet Take 1 tablet by mouth 2 times daily 180 tablet 3    amoxicillin (AMOXIL) 500 MG capsule Take 1 capsule by mouth 4 times daily      tamsulosin (FLOMAX) 0.4 MG capsule Take 1 capsule by mouth daily 90 capsule 3    mirabegron (MYRBETRIQ) 50 MG TB24 Take 50 mg by mouth daily 90 tablet 3    spironolactone-hydrochlorothiazide (ALDACTAZIDE) 25-25 MG per tablet Take 1 tablet by mouth daily 30 tablet 3    tiotropium (SPIRIVA HANDIHALER) 18 MCG inhalation capsule Inhale 1 capsule into the lungs daily 30 capsule 3    amLODIPine-benazepril (LOTREL) 5-10 MG per capsule TAKE 1 CAPSULE DAILY 90 capsule 4    docusate sodium (COLACE) 100 MG capsule Take 100 mg by mouth daily      OLANZapine-FLUoxetine HCl 12-50 MG CAPS TAKE 1 CAPSULE NIGHTLY 90 capsule 3    aspirin 325 MG EC tablet Take 1 tablet by mouth daily 90 tablet 3    atorvastatin (LIPITOR) 10 MG tablet Take 1 tablet by mouth nightly 90 tablet 3    albuterol sulfate HFA (VENTOLIN HFA) 108 (90 Base) MCG/ACT inhaler Inhale 2 puffs into the lungs every 6 hours as needed for Wheezing or Shortness of Breath 1 Inhaler 2       Patient has no known allergies.   Social History     Tobacco Use   Smoking Status Former

## 2020-01-22 NOTE — TELEPHONE ENCOUNTER
LM with pt to call office about appt. for 1/23/2020 sleep study was positive for sleep apnea, it is recommended patient to have a titration. would patient like to go for the second part of the sleep study ( titration) or does patient want to discuss this at appointment.  AH

## 2020-01-23 PROBLEM — G47.33 OBSTRUCTIVE SLEEP APNEA: Status: ACTIVE | Noted: 2020-01-01

## 2020-01-23 NOTE — PROGRESS NOTES
[facility]    Gaurav Balderas, 68 y.o.  [id number]      Pt of [facility]   Nurses Notes   Yasmin Wolf is here for a sleep study follow up  Study Results  Initial Study Date -  2020  AHI -  71.8    TotalEvents - 462  (Apneas  89  Hypopneas 373  Central  0)  LM w/Arousals - 0  Sleep Efficiency - 85.8 % (Total Sleep Time - 386 min)  Time with Sats below 88% - 197.3 min  Interval History       Gaurav Balderas is a 68 y.o. old male who comes in to review the results of his recent sleep study, to answer questions and to explore options for treatment.  he continues to have symptoms of snoring, excessive daytime sleepiness, feels sleepy during the day    PMHx  Past Medical History:   Diagnosis Date    Arthritis     Emphysema     Hayfever     Hyperlipidemia     Hypertension     Snores      Past Surgical History:   Procedure Laterality Date    BACK SURGERY  2019    FOOT SURGERY Right 2009    LEG SURGERY Right @ 2-3 yrs old    TOTAL HIP ARTHROPLASTY Right 2019    VASECTOMY  @ 27 yrs old     Social History     Tobacco Use    Smoking status: Former Smoker     Packs/day: 1.50     Years: 45.00     Pack years: 67.50     Types: Cigars     Start date: 1963     Last attempt to quit: 2020     Years since quittin.0    Smokeless tobacco: Never Used    Tobacco comment: quit 2019    Substance Use Topics    Alcohol use: No     Alcohol/week: 0.0 standard drinks    Drug use: No     Family History   Problem Relation Age of Onset    Cancer Father         Prostate    Mental Illness Mother         \"nervous condition\"    Cancer Sister         throat    Heart Disease Brother         MI x2    Pancreatic Cancer Brother     Liver Cancer Brother     Cancer Maternal Grandfather     Heart Disease Maternal Grandfather      Allergies  No Known Allergies  Meds  Current Outpatient Medications   Medication Sig Dispense Refill    acetaminophen (TYLENOL) 500 MG tablet Take 1,000 mg by mouth 3 times daily      Cholecalciferol (VITAMIN D3) 125 MCG (5000 UT) TABS Take by mouth      metoprolol tartrate (LOPRESSOR) 25 MG tablet Take 0.5 tablets by mouth 2 times daily 30 tablet 5    etodolac (LODINE) 500 MG tablet Take 1 tablet by mouth 2 times daily 180 tablet 3    amoxicillin (AMOXIL) 500 MG capsule Take 1 capsule by mouth 4 times daily      tamsulosin (FLOMAX) 0.4 MG capsule Take 1 capsule by mouth daily 90 capsule 3    mirabegron (MYRBETRIQ) 50 MG TB24 Take 50 mg by mouth daily 90 tablet 3    spironolactone-hydrochlorothiazide (ALDACTAZIDE) 25-25 MG per tablet Take 1 tablet by mouth daily 30 tablet 3    tiotropium (SPIRIVA HANDIHALER) 18 MCG inhalation capsule Inhale 1 capsule into the lungs daily 30 capsule 3    amLODIPine-benazepril (LOTREL) 5-10 MG per capsule TAKE 1 CAPSULE DAILY 90 capsule 4    docusate sodium (COLACE) 100 MG capsule Take 100 mg by mouth daily      OLANZapine-FLUoxetine HCl 12-50 MG CAPS TAKE 1 CAPSULE NIGHTLY 90 capsule 3    aspirin 325 MG EC tablet Take 1 tablet by mouth daily 90 tablet 3    atorvastatin (LIPITOR) 10 MG tablet Take 1 tablet by mouth nightly 90 tablet 3    albuterol sulfate HFA (VENTOLIN HFA) 108 (90 Base) MCG/ACT inhaler Inhale 2 puffs into the lungs every 6 hours as needed for Wheezing or Shortness of Breath 1 Inhaler 2     No current facility-administered medications for this visit. ROS  Review of Systems   Constitutional: Negative for activity change, appetite change, chills and fever. HENT: Negative for congestion and postnasal drip. Eyes: Negative. Respiratory: Positive for shortness of breath. Negative for cough, chest tightness, wheezing and stridor. Cardiovascular: Negative for chest pain and leg swelling. Gastrointestinal: Negative for diarrhea and nausea. Endocrine: Negative. Genitourinary: Negative. Musculoskeletal: Negative. Negative for arthralgias and back pain. Skin: Negative. Allergic/Immunologic: Negative. Neurological: Negative. Negative for dizziness and light-headedness. Psychiatric/Behavioral: Negative. All other systems reviewed and are negative. Exam  Vitals -  /74 (Site: Left Upper Arm, Position: Sitting)   Pulse 91   Ht 5' 6\" (1.676 m)   Wt 208 lb (94.3 kg)   SpO2 92% Comment: on ra  BMI 33.57 kg/m²    Body mass index is 33.57 kg/m². Oxygen level - Room Air  Physical Exam  Constitutional:       Appearance: He is well-developed. HENT:      Head: Normocephalic and atraumatic. Right Ear: External ear normal.      Left Ear: External ear normal.   Eyes:      Conjunctiva/sclera: Conjunctivae normal.      Pupils: Pupils are equal, round, and reactive to light. Neck:      Musculoskeletal: Normal range of motion and neck supple. Cardiovascular:      Rate and Rhythm: Normal rate and regular rhythm. Heart sounds: Normal heart sounds. Pulmonary:      Effort: Pulmonary effort is normal.      Breath sounds: Normal breath sounds. Musculoskeletal: Normal range of motion. Skin:     General: Skin is warm and dry. Neurological:      Mental Status: He is alert and oriented to person, place, and time. Psychiatric:         Behavior: Behavior normal.         Thought Content: Thought content normal.         Judgment: Judgment normal.        Assessment   Diagnosis Orders   1. Obstructive sleep apnea  Sleep Study with PAP Titration   2. Obesity (BMI 30-39.9)     3. Hypersomnia     4. Snoring        Recommendations  PSG positive for sleep apnea  Several options for treatment were discussed including positional therapy, OAT and CPAP. The benefits and limitations of each were discussed. After addressing his  concerns, Mleissa Quintero  decided to move ahead with a CPAP titration. Melissa Quintero  also was interested trying out some masks before the study.      Schedule for a CPAP Titration   Mask desensitization   Follow up 6-8 weeks after PAP set up        Larry Simon Jonathan Rivera, [facility]  1/23/2020

## 2020-01-29 NOTE — PROGRESS NOTES
[address] [place] [address]                               SLEEP STUDY REPORT    PATIENT NAME: Karl Barrientos                    :        1950  MED REC NO:   [id number]                           ROOM:  ACCOUNT NO:   [de-identified]                           ADMIT DATE: 2020  PROVIDER:     Jane Guaman MD    DATE OF STUDY:  2020    CPAP TITRATION STUDY REPORT    REFERRING PROVIDER:  Kvng Alcantara DO    The patient's height is 66 inches, weight is 213 pounds with a BMI of  34.4. HISTORY:  The patient is a 78-year-old gentleman, underwent initial  baseline sleep study on 2020. The patient is currently being  followed by Jessee Liu PA-C. In fact, the patient had a followup  performed on 2020 with Ms. Jessee Liu. The patient's sleep  test, which was performed on 2020 revealed severe obstructive  sleep apnea with worsening of respiratory events in REM sleep. The  patient was scheduled for CPAP titration due to associated nocturnal  hypoxia. METHODS:  The patient underwent digital polysomnography in compliance  with the standards and specifications from the AASM Manual including the  simultaneous recording of 3 EEG channels (F4-M1, C4-M1, and O2-M1 with  back up electrodes F3-M2, C3-M2, and O1-M2), 2 EOG channels (E1-M2, and  E2-M1,), EMG (chin, left & right leg), EKG, Nonin pulse oximetry with   less than 2 second averaging time, body position, airflow recorded by  oral-nasal thermal sensor and nasal air pressure transducer, plus  respiratory effort recorded by calibrated respiratory inductance  plethysmography (RIP), flow volume loop, sound and video. Sleep staging  and scoring followed the standard put forth by the American Academy of  Sleep Medicine and utilized the 4A obstructive hypopnea event  desaturation of 4 percent or greater.     INTERPRETATION: This a CPAP titration study and the study was performed  on 01/27/2020. The study was started at 10:05 p.m. and was terminated  at 05:12 a.m. with a total recording time of 427.2 minutes, sleep period  time of 416.8 minutes and total sleep time of 349 minutes and overall  sleep efficiency of 81.7%. The sleep onset latency was 10.4 minutes and  wake after sleep onset was 67.8 minutes and REM sleep latency was 142.5  minutes. SLEEP STAGING AND DISTRIBUTION SUMMARY:  Revealed the patient spent 63.5  minutes in stage I consisting of 18.2%, 169.5 minutes in stage II  consisting of 48.6% and 116 minutes in REM sleep consisting of 33.2%. The patient had absent slow wave sleep. CPAP TITRATION STUDY:  The CPAP titration study was started with a CPAP  pressure of 5 cm of water and the CPAP pressure was gradually increased  to a CPAP pressure of 6 cm of water by titrating to apneas and  hypopneas. At a CPAP pressure of 6 cm of water, the patient spent 4  hours 48 minutes in bed. Out of 4 hours 48 minutes, the patient slept  for a period of 1 hour 56 minutes in REM sleep, 2 hours 9 minutes in  non-REM sleep. At a CPAP pressure of 6 cm of water, the patient had a  total of one obstructive hypopnea event with an apnea-hypopnea index of  0.2. The maximum oxygen desaturation recorded at this pressure was 86%;  however, majority of the time towards the end of the study the patient's  oxygen saturation remained at 89%. The patient's mean oxygen saturation  was 88.8% on room air. PERIODIC LIMB MOVEMENT ANALYSIS:  Revealed the patient had a total of  zero periodic limb movements. The patient had a total of 11 spontaneous  arousals with a spontaneous arousal index of 1.9. EKG MONITORING:  Revealed normal sinus rhythm. IMPRESSION:  1. Severe obstructive sleep apnea. The patient had optimal titration  to a CPAP pressure of 6 cm of water on room air.   2.  The patient had a good amount of REM sleep during titration,  consistent with 33.2% of total sleep time. 3.  Nocturnal hypoxia, improved with CPAP therapy. RECOMMENDATIONS:  1. For the patient's sleep-disordered breathing, we recommend starting  therapy with a CPAP pressure of 6 cm of water. 2.  Specific recommendations include the patient's choice of interface  was Simplus large size full face mask. 3.  The patient should be scheduled for a followup with Ms. Bekah Salazar' clinic in six to eight weeks on recommended CPAP therapy for  clinical re-evaluation with review of download. Thanks to Ms. Bekah Salazar for giving me this opportunity to  participate in the care of this pleasant gentleman.         Anders Denny MD    D: 01/28/2020 20:11:14       T: 01/29/2020 4:32:49     [email or web address]  Job#: [id number]     Doc#: [id number]    CC:

## 2020-02-05 PROBLEM — Z98.890 S/P CARDIAC CATH: Status: ACTIVE | Noted: 2020-01-01

## 2020-02-05 PROBLEM — R06.09 DOE (DYSPNEA ON EXERTION): Status: ACTIVE | Noted: 2019-01-01

## 2020-02-05 PROBLEM — J96.01 ACUTE RESPIRATORY FAILURE WITH HYPOXIA (HCC): Status: ACTIVE | Noted: 2020-01-01

## 2020-02-05 PROBLEM — I25.10 CAD IN NATIVE ARTERY: Status: ACTIVE | Noted: 2020-01-01

## 2020-02-05 NOTE — PRE SEDATION
[place], APRN - CNP    sodium chloride flush 0.9 % injection 10 mL, 10 mL, Intravenous, 2 times per day, EDINSON Aguila CNP    sodium chloride flush 0.9 % injection 10 mL, 10 mL, Intravenous, PRN, EDINSON Aguila CNP  Prior to Admission medications    Medication Sig Start Date End Date Taking?  Authorizing Provider   Cholecalciferol (VITAMIN D3) 125 MCG (5000 UT) TABS Take by mouth   Yes Historical Provider, MD   metoprolol tartrate (LOPRESSOR) 25 MG tablet Take 0.5 tablets by mouth 2 times daily 1/15/20  Yes Cherelle Norris MD   etodolac (LODINE) 500 MG tablet Take 1 tablet by mouth 2 times daily 1/9/20  Yes Elmer Guajardo DO   amoxicillin (AMOXIL) 500 MG capsule Take 1 capsule by mouth 4 times daily 1/6/20  Yes Historical Provider, MD   tamsulosin (FLOMAX) 0.4 MG capsule Take 1 capsule by mouth daily 1/2/20  Yes EDINSON Botello CNP   mirabegron (MYRBETRIQ) 50 MG TB24 Take 50 mg by mouth daily 1/2/20  Yes EDINSON Botello CNP   spironolactone-hydrochlorothiazide (ALDACTAZIDE) 25-25 MG per tablet Take 1 tablet by mouth daily 12/23/19  Yes Cherelle Norris MD   tiotropium (Katheren Camden) 18 MCG inhalation capsule Inhale 1 capsule into the lungs daily 12/16/19  Yes Xavier Kendrick MD   amLODIPine-benazepril (LOTREL) 5-10 MG per capsule TAKE 1 CAPSULE DAILY 10/14/19  Yes Elmer Guajardo DO   docusate sodium (COLACE) 100 MG capsule Take 100 mg by mouth daily   Yes Historical Provider, MD   aspirin 325 MG EC tablet Take 1 tablet by mouth daily 7/10/19  Yes Elmer Guajardo DO   atorvastatin (LIPITOR) 10 MG tablet Take 1 tablet by mouth nightly 7/10/19  Yes Elmer Guajardo DO   albuterol sulfate HFA (VENTOLIN HFA) 108 (90 Base) MCG/ACT inhaler Inhale 2 puffs into the lungs every 6 hours as needed for Wheezing or Shortness of Breath 7/10/19  Yes Nemiah Dull, DO   acetaminophen (TYLENOL) 500 MG tablet Take 1,000 mg by mouth every 4 hours as needed     Historical Provider, MD   OLANZapine-FLUoxetine HCl 12-50 MG CAPS TAKE 1 CAPSULE NIGHTLY 7/10/19   Tami Javier DO     Additional information:       VITAL SIGNS   Vitals:    02/05/20 1046   BP: (!) 161/77   Pulse: 84   Resp: 16   Temp: 97.5 °F (36.4 °C)   SpO2: 96%       PHYSICAL:   Heart:  [x]Regular rate and rhythm  []Other:    Lungs:  [x]Clear    []Other:    Abdomen: [x]Soft    []Other:    Mental Status: [x]Alert & Oriented  []Other:      PLANNED PROCEDURE   [x]Cath  []PCI                []Pacemaker/AICD  []HEYDI             []Cardioversion []Peripheral angiography/PTA  []Other:      SEDATION  Planned agent:[x]Midazolam []Meperidine [x]Sublimaze []Morphine  []Diazepam  []Other:     ASA Classification:  []1 []2 [x]3 []4 []5  Class 1: A normal healthy patient  Class 2: Pt with mild to moderate systemic disease  Class 3: Severe systemic disease or disturbance  Class 4: Severe systemic disorders that are already life threatening. Class 5: Moribund pt with little chances of survival, for more than 24 hours. Mallampati I Airway Classification:   []1 [x]2 []3 []4    [x]Pre-procedure diagnostic studies complete and results available. Comment:    [x]Previous sedation/anesthesia experiences assessed. Comment:    [x]The patient is an appropriate candidate to undergo the planned procedure sedation and anesthesia. (Refer to nursing sedation/analgesia documentation record)  [x]Formulation and discussion of sedation/procedure plan, risks, and expectations with patient and/or responsible adult completed. [x]Patient examined immediately prior to the procedure.  (Refer to nursing sedation/analgesia documentation record)    Chica Early  Electronically signed 2/5/2020 at 12:50 PM

## 2020-02-06 VITALS
WEIGHT: 208 LBS | OXYGEN SATURATION: 96 % | SYSTOLIC BLOOD PRESSURE: 82 MMHG | RESPIRATION RATE: 22 BRPM | HEIGHT: 66 IN | HEART RATE: 125 BPM | BODY MASS INDEX: 33.43 KG/M2 | TEMPERATURE: 97.5 F

## 2020-02-06 LAB
EKG ATRIAL RATE: 121 BPM
EKG ATRIAL RATE: 84 BPM
EKG P AXIS: 67 DEGREES
EKG P AXIS: 83 DEGREES
EKG P-R INTERVAL: 128 MS
EKG P-R INTERVAL: 144 MS
EKG Q-T INTERVAL: 312 MS
EKG Q-T INTERVAL: 392 MS
EKG QRS DURATION: 66 MS
EKG QRS DURATION: 84 MS
EKG QTC CALCULATION (BAZETT): 443 MS
EKG QTC CALCULATION (BAZETT): 463 MS
EKG R AXIS: 70 DEGREES
EKG R AXIS: 77 DEGREES
EKG T AXIS: 81 DEGREES
EKG T AXIS: 98 DEGREES
EKG VENTRICULAR RATE: 121 BPM
EKG VENTRICULAR RATE: 84 BPM

## 2020-02-06 PROCEDURE — 2709999900 HC NON-CHARGEABLE SUPPLY

## 2020-02-06 PROCEDURE — 6360000002 HC RX W HCPCS: Performed by: FAMILY MEDICINE

## 2020-02-06 RX ORDER — 0.9 % SODIUM CHLORIDE 0.9 %
INTRAVENOUS SOLUTION INTRAVENOUS CONTINUOUS PRN
Status: COMPLETED | OUTPATIENT
Start: 2020-01-01 | End: 2020-01-01

## 2020-02-06 RX ORDER — MORPHINE SULFATE 2 MG/ML
INJECTION, SOLUTION INTRAMUSCULAR; INTRAVENOUS
Status: COMPLETED | OUTPATIENT
Start: 2020-01-01 | End: 2020-02-06

## 2020-02-06 RX ORDER — LORAZEPAM 2 MG/ML
INJECTION INTRAMUSCULAR
Status: COMPLETED | OUTPATIENT
Start: 2020-02-06 | End: 2020-02-06

## 2020-02-06 RX ORDER — LORAZEPAM 2 MG/ML
INJECTION INTRAMUSCULAR
Status: COMPLETED | OUTPATIENT
Start: 2020-01-01 | End: 2020-01-01

## 2020-02-06 RX ORDER — MORPHINE SULFATE 2 MG/ML
INJECTION, SOLUTION INTRAMUSCULAR; INTRAVENOUS
Status: COMPLETED | OUTPATIENT
Start: 2020-01-01 | End: 2020-01-01

## 2020-02-06 RX ADMIN — LORAZEPAM 1 MG: 2 INJECTION, SOLUTION INTRAMUSCULAR; INTRAVENOUS at 00:48

## 2020-02-06 NOTE — PROGRESS NOTES
Respiratory care participated in Code. End tidal CO2 monitor was applied to patient. For further information see Code sheet.

## 2020-02-06 NOTE — FLOWSHEET NOTE
02/05/20 2225   Encounter Summary   Services provided to: Patient and family together   Referral/Consult From: Nursing Supervisor/Manager   Support System Children;Family members;Friends/neighbors   [facility]   Continue Visiting No  (2/5/20 death)   Complexity of Encounter High   Length of Encounter 2 hours   Spiritual Assessment Completed Yes   Crisis   Type Code   Spiritual/Protestant   Type Spiritual support   Grief and Life Adjustment   Type Death     Pt is a 68year old male. Pt coded and I was called to provide spiritual care. At the time of entry, patient had no family. I assisted medical team by contacting patient's family friend whose name was on the list for contact. I placed a call and Elysia white who is a friend to the patient. She came in and helped us to contact family members including patient's daughter. I also provided emotional support including prayer. I also assisted family to fill out bereavement form for [place]. I assisted Patient's friend Valentina Brand) to how to contact Patient's sister in [place] and the other family member who are out of the country on a tour. After several attempt for phone calls, we were able to contact patient's daughter who came down with her . I extended condolence to the family un behalf of the staff of [facility]. [place] [address] for their lose. No need for a follow up at this time.

## 2020-02-06 NOTE — PROCEDURES
Predilation was done using the same balloon, multiple segments of the  vessel. Post dilation, there was an area of small dissection in the  proximal to mid RCA. I then proceeded with stenting. I first deployed a 4.0 mm x 80 mm  Xience drug-eluting stent in the mid to distal RCA. This was done with  significant difficulty due to difficulty in delivering the stent to the  target lesion area. I had to use [place] 6-[place] guide extender. The [place] was advanced over a 3.0 mm x 12 mm PTCA balloon. I  finally was able to deliver the stent to the target lesion area and a  4.0 mm x 80 mm drug-eluting stent was deployed. I then performed post  dilation of the stent using 4.5 mm x 8 mm NC balloon. I then proceeded  with stenting of the proximal to mid RCA area. A 4.0 mm x 38 mm Xience  drug-eluting stent was advanced. Again, there was significant  difficulty related to diffuse nature of disease with tortuosity of the  right coronary artery vessel. With help from the [place], I was  eventually able to deliver the stent. There was still missing area  without overlap with the previously deployed stent; however, due to  extension of the dissection with some flow limitation, I decided to  deploy the Xience 4.0 mm x 38 mm stent. A 3.5 x 23 mm Xience  drug-eluting stent was deployed in the area between two stents. For the  ostium, I used a 4.0 mm x 23 mm Xience drug-eluting stent. Post  dilation using 4.5 mm x 12 mm NC balloon. After dilation, final  angiogram revealed well-expanded stent with WYATT-3 flow. No  complication including no distal embolization, perforation, or distal  dissection. The patient continued to do well and was having no chest  pain. No EKG changes. Guiding catheter was removed. The patient  received loading dose of Brilinta at 180 mg p.o. x1. Vasc Band was  applied after sheath was removed. Hemostasis was achieved. MEDICATIONS:  See MAR.     COMPLICATIONS: None.    ESTIMATED BLOOD LOSS:  Less than 40 mL. ACCESS:  Right radial artery access. CONCLUSION:  1. Successful PCI with stenting of right coronary artery. 2.  Status post IVUS of RCA. PLAN:  1. The patient will be admitted to the hospital for observation, post  PCI of RCA. 2.  Aspirin 81 mg p.o. daily. 3.  Brilinta 180 mg p.o. b.i.d.  4.  Increase Lipitor to 80 mg p.o. daily. 5.  Optimize medical therapy for coronary artery disease.     Findings and plan of care discussed with the patient and his friend as  per the patient's request.        Girish Day MD    D: 02/05/2020 21:06:53       T: 02/05/2020 21:13:23     [email or web address]  Job#: [id number]     Doc#: [id number]    CC:

## 2020-02-06 NOTE — H&P
[id number]       Date of Study         01/08/2020      Date of Birth    1950      Referring Physician   Neel Rosen DO      Age              68 year(s)      [place] Ortiz, [facility]                                       Interpreting          Neel Marcano MD                                    Physician     Procedure    Type of Study      TTE procedure:ECHOCARDIOGRAM COMPLETE 2D W DOPPLER W COLOR. Procedure Date  Date: 01/08/2020 Start: 09:33 AM    Study Location: Echo Lab  Technical Quality: Limited visualization due to poor acoustical window. Indications:Shortness of breath and Lower extremity edema. Additional Medical History:Hypertension, Hyperlipidemia, CHF, Family history  of heart disease, Tobacco use, COPD    Patient Status: Routine    Height: 65.75 inches Weight: 209 pounds BSA: 2.03 m^2 BMI: 33.99 kg/m^2    BP: 132/64 mmHg     Conclusions      Summary   Normal left ventricle size and systolic function. Ejection fraction was   estimated at 65 %. There were no regional left ventricular wall motion   abnormalities and wall thickness was within normal limits. Doppler parameters were consistent with abnormal left ventricular   relaxation (grade 1 diastolic dysfunction). Signature      ----------------------------------------------------------------   Electronically signed by Neel Marcano MD (Interpreting   physician) on 01/08/2020 at 05:11 PM   ----------------------------------------------------------------      Findings      Mitral Valve   The mitral valve structure was normal with normal leaflet separation. DOPPLER: The transmitral velocity was within the normal range with no   evidence for mitral stenosis. Trace mitral regurgitation is present. Aortic Valve   The aortic valve was trileaflet with normal thickness and cuspal   separation.  DOPPLER: Transaortic velocity was within the normal range with   no evidence of aortic stenosis. There was no evidence of aortic   regurgitation. Tricuspid Valve   The tricuspid valve structure was normal with normal leaflet separation. DOPPLER: There was no evidence of tricuspid stenosis. Mild tricuspid regurgitation visualized. Pulmonic Valve   The pulmonic valve leaflets exhibited normal thickness, no calcification,   and normal cuspal separation. DOPPLER: The transpulmonic velocity was   within the normal range with no evidence for regurgitation. Left Atrium   Left atrial size was normal.      Left Ventricle   Normal left ventricle size and systolic function. Ejection fraction was   estimated at 65 %. There were no regional left ventricular wall motion   abnormalities and wall thickness was within normal limits. Doppler parameters were consistent with abnormal left ventricular   relaxation (grade 1 diastolic dysfunction). Right Atrium   Right atrial size was normal.      Right Ventricle   The right ventricular size was normal with normal systolic function and   wall thickness. Pericardial Effusion   The pericardium was normal in appearance with no evidence of a pericardial   effusion. Pleural Effusion   No evidence of pleural effusion. Aorta / Great Vessels   -Aortic root dimension within normal limits.   -The Pulmonary artery is within normal limits. -IVC size is within normal limits with normal respiratory phasic changes.      M-Mode/2D Measurements & Calculations      LV Diastolic   LV Systolic Dimension:    AV Cusp Separation: 1.8 cmLA   Dimension: 4.6 3.1 cm                    Dimension: 2.8 cmAO Root   cm             LV Volume Diastolic: 88.1 Dimension: 2.9 cmLA Area: 17.7   LV FS:32.6 %   ml                        cm^2   LV PW          LV Volume Systolic: 62.6   Diastolic: 1   ml   cm             LV EDV/LV EDV Index: 97.3   Septum         ml/48 m^2LV ESV/LV ESV    RV Diastolic Dimension: 3.2 cm   Diastolic: 0.9 Index: 37.9 ml/19 m^2   cm             EF Calculated: 61.1 %     LA/Aorta: 0.97                                               LA volume/Index: 50 ml /25m^2     Doppler Measurements & Calculations      MV Peak E-Wave: 71 cm/s    AV Peak Velocity: 144 LVOT Peak Velocity: 106   MV Peak A-Wave: 96.1 cm/s  cm/s                  cm/s   MV E/A Ratio: 0.74         AV Peak Gradient:     LVOT Peak Gradient: 4   MV Peak Gradient: 2.02     8.29 mmHg             mmHg   mmHg                                                    TV Peak E-Wave: 48.7 cm/s   MV Deceleration Time: 201                        TV Peak A-Wave: 58.9 cm/s   msec                              IVRT: 92 msec         TV Peak Gradient: 0.95                                                    mmHg   MV E' Septal Velocity: 5.7                       TR Velocity:231 cm/s   cm/s                       AV DVI (Vmax):0.74    TR Gradient:21.34 mmHg   MV A' Septal Velocity: 10                        PV Peak Velocity: 82.5   cm/s                                             cm/s   MV E' Lateral Velocity:                          PV Peak Gradient: 2.72   7.1 cm/s                                         mmHg   MV A' Lateral Velocity:   11.9 cm/s   E/E' septal: 12.46   E/E' lateral: 10     [email or web address] DocKey=[id number]        Past Medical History:    Past Medical History:   Diagnosis Date    Arthritis     Emphysema     Hayfever     Hyperlipidemia     Hypertension     Snores        Past Surgical History:    Past Surgical History:   Procedure Laterality Date    BACK SURGERY  02/2019    FOOT SURGERY Right 11/23/2009    LEG SURGERY Right @ 2-3 yrs old    TOTAL HIP ARTHROPLASTY Right 05/2019    VASECTOMY  @ 27 yrs old       Medications Prior to Admission:    Medications Prior to Admission: Cholecalciferol (VITAMIN D3) 125 MCG (5000 UT) TABS, Take by murmur. No rubs, clicks, or gallops, distal pulses intact, no carotid bruits, Negative JVD  Radial Pulses: intact 2+  Abdomen: soft, non-tender, non-distended, normal bowel sounds, no masses or organomegaly  Extremities: no cyanosis, clubbing . no Edema  Musculoskeletal: normal range of motion, no joint swelling, deformity or tenderness      RADIOLOGY   No results found. LABS:  No results for input(s): CKTOTAL, CKMB, CKMBINDEX, TROPONINT in the last 72 hours.   CBC:   Lab Results   Component Value Date    WBC 10.6 02/05/2020    RBC 4.96 02/05/2020    HGB 17.5 02/05/2020    HCT 52.2 02/05/2020    .2 02/05/2020    MCH 35.3 02/05/2020    MCHC 33.5 02/05/2020     02/05/2020    MPV 9.7 02/05/2020     BMP:    Lab Results   Component Value Date     02/05/2020    K 4.2 02/05/2020     02/05/2020    CO2 24 02/05/2020    BUN 16 02/05/2020    LABALBU 3.6 07/08/2019    CREATININE 0.8 02/05/2020    CALCIUM 8.3 02/05/2020    LABGLOM >90 02/05/2020    GLUCOSE 163 02/05/2020    GLUCOSE 105 07/16/2015     Hepatic Function Panel:    Lab Results   Component Value Date    ALKPHOS 118 07/08/2019    ALT 86 07/08/2019    AST 56 07/08/2019    PROT 7.6 07/08/2019    BILITOT 0.6 07/08/2019    LABALBU 3.6 07/08/2019     Magnesium:    Lab Results   Component Value Date    MG 2.1 12/27/2019     Warfarin PT/INR:  No components found for: PTPATWAR, PTINRWAR  HgBA1c:    Lab Results   Component Value Date    LABA1C 5.8 07/08/2019     FLP:    Lab Results   Component Value Date    TRIG 150 07/08/2019    HDL 35 07/08/2019    HDL 30 03/21/2012    LDLCALC 127 07/08/2019    LABVLDL 27 07/10/2018     TSH:    Lab Results   Component Value Date    TSH 2.280 07/08/2019     BNP: No results found for: BNP      ASSESSMENT:  The patient was recently evaluated in office for worsening dyspnea on exertion and shortness of breath, angina equivalent symptoms, that persisted despite medical therapy, including with Ca channel blocker and lopressor. TTE revealed preserved EF, diastolic dysfunction. Nuclear stress test was positive for inferior wall ischemia. Today, LHC revealed evidence of RCA. After d/w Dr Trevor Gage and consent by patient, decision was made to proceed with PCI. IVUS of RCA was performed. The patient is now s/p PCI with stenting of RCA.  Patient denies orthopnea, paroxysmal nocturnal dyspnea, palpitations, dizziness, syncope, recent weight gain or leg swelling.   -Worsening dyspnea on exertion  -Angina equivalent symptoms  -Abnormal stress test  -IVUS of RCA  -Successful PCI/Stenting of RCA          -HTN  -Dyslipidemia     Recommendations:  -Bedrest for 4 hours post procedure   -Admit overnight for observation post PCI RCA  -Monitor on Telemetry   -Optimize medical therapy for Coronary Artery Disease  -Aggressive risk factor modification   -Access site care  -Antiplatelet therapy: ASA 81 mg PO daily and Birlinta 90 mg po BID    -High intensity statin therapy, Lipitor 80 mg po daily   -Labs: BMP, CBC  -Outpatient follow up in office in 2 weeks      Findings and plan of care were discussed with the patient and patient's friend (per patient's request) in details, questions were answered, agreeable to above mentioned plan.       Abi Myles MD, Angelica Ye    8:43 PM  2/5/2020

## 2020-02-06 NOTE — BRIEF OP NOTE
[facility]  Sedation/Analgesia Post Sedation Record    Pt Name: Bhaskar Moore  Account number: [de-identified]  MRN: [id number]  YOB: 1950  Procedure Performed By: Omayra Bustamante MD   Primary Care Physician: Verenice Veliz DO  Date: 2/5/2020    POST-PROCEDURE    Physicians/Assistants: Omayra Bustamante MD     Procedure Performed:IVUS, RCA, PCI/Stenting of RCA      Sedation/Anesthesia: Versed/ Fentanyl and 2% xylocaine local anesthesia. Estimated Blood Loss: < 50 ml. Specimens Removed: None         Disposition of Specimen: N/A        Complications: No Immediate Complications. Post-procedure Diagnosis/Findings:       IVUS of RCA  Successful PCI/Stenting of RCA     Recommendations:  -Bedrest for 4 hours post procedure   -Admit overnight for observation post PCI RCA  -Monitor on Telemetry   -Optimize medical therapy for Coronary Artery Disease  -Aggressive risk factor modification   -Access site care  -Antiplatelet therapy: ASA 81 mg PO daily and Birlinta 90 mg po BID    -High intensity statin therapy, Lipitor 80 mg po daily   -Labs: BMP, CBC  -Outpatient follow up in office in 2 weeks     Findings and plan of care were discussed with the patient and patient's friend (per patient's request) in details, questions were answered, agreeable to above mentioned plan.         Omayra Bustamante MD, Marvin Fernández   Electronically signed 2/5/2020 at 8:34 PM  Interventional Cardiology

## 2020-02-06 NOTE — PROGRESS NOTES
Contacted Alexia At 2355pm   Viral contacted this nurse at 2358pm and ordered the body to be released.

## 2020-02-11 RX ORDER — ETODOLAC 500 MG/1
TABLET, FILM COATED ORAL
Qty: 180 TABLET | Refills: 4 | OUTPATIENT
Start: 2020-02-11

## 2022-06-10 NOTE — PROGRESS NOTES
PAT call attempted, patient unavailable, left message to please call us back at your earliest convenience; [phone] Cimzia Pregnancy And Lactation Text: This medication crosses the placenta but can be considered safe in certain situations. Cimzia may be excreted in breast milk.